# Patient Record
Sex: FEMALE | Race: WHITE | NOT HISPANIC OR LATINO
[De-identification: names, ages, dates, MRNs, and addresses within clinical notes are randomized per-mention and may not be internally consistent; named-entity substitution may affect disease eponyms.]

---

## 2017-08-01 ENCOUNTER — TRANSCRIPTION ENCOUNTER (OUTPATIENT)
Age: 42
End: 2017-08-01

## 2018-01-28 ENCOUNTER — EMERGENCY (EMERGENCY)
Facility: HOSPITAL | Age: 43
LOS: 0 days | Discharge: HOME | End: 2018-01-28

## 2018-01-28 DIAGNOSIS — Z87.891 PERSONAL HISTORY OF NICOTINE DEPENDENCE: ICD-10-CM

## 2018-01-28 DIAGNOSIS — N89.8 OTHER SPECIFIED NONINFLAMMATORY DISORDERS OF VAGINA: ICD-10-CM

## 2019-09-20 ENCOUNTER — EMERGENCY (EMERGENCY)
Facility: HOSPITAL | Age: 44
LOS: 0 days | Discharge: HOME | End: 2019-09-21
Attending: EMERGENCY MEDICINE | Admitting: EMERGENCY MEDICINE
Payer: SUBSIDIZED

## 2019-09-20 VITALS
RESPIRATION RATE: 18 BRPM | OXYGEN SATURATION: 98 % | DIASTOLIC BLOOD PRESSURE: 69 MMHG | HEART RATE: 87 BPM | SYSTOLIC BLOOD PRESSURE: 106 MMHG | TEMPERATURE: 99 F

## 2019-09-20 VITALS
DIASTOLIC BLOOD PRESSURE: 81 MMHG | OXYGEN SATURATION: 98 % | WEIGHT: 134.04 LBS | RESPIRATION RATE: 18 BRPM | HEART RATE: 88 BPM | TEMPERATURE: 97 F | SYSTOLIC BLOOD PRESSURE: 144 MMHG

## 2019-09-20 DIAGNOSIS — R10.9 UNSPECIFIED ABDOMINAL PAIN: ICD-10-CM

## 2019-09-20 DIAGNOSIS — O20.9 HEMORRHAGE IN EARLY PREGNANCY, UNSPECIFIED: ICD-10-CM

## 2019-09-20 PROCEDURE — 99053 MED SERV 10PM-8AM 24 HR FAC: CPT

## 2019-09-20 PROCEDURE — 99284 EMERGENCY DEPT VISIT MOD MDM: CPT

## 2019-09-20 RX ORDER — LEVOTHYROXINE SODIUM 125 MCG
1 TABLET ORAL
Qty: 0 | Refills: 0 | DISCHARGE

## 2019-09-20 RX ORDER — SODIUM CHLORIDE 9 MG/ML
1000 INJECTION INTRAMUSCULAR; INTRAVENOUS; SUBCUTANEOUS ONCE
Refills: 0 | Status: COMPLETED | OUTPATIENT
Start: 2019-09-20 | End: 2019-09-20

## 2019-09-20 NOTE — ED ADULT NURSE NOTE - CAS ELECT INFOMATION PROVIDED
DC instructions/pt instructed to follow up with obgyn as soon as possible, return to ed for new or worsening symptoms

## 2019-09-20 NOTE — ED ADULT NURSE NOTE - OBJECTIVE STATEMENT
Pt states she hasn't had her monthly in 2 months, took pregnancy test x 2 which stated positive. States began experiencing vaginal bleeding x yesterday and suprapubic pain x today

## 2019-09-20 NOTE — ED ADULT TRIAGE NOTE - CHIEF COMPLAINT QUOTE
Pt came c/o abdominal cramping and vaginal spotting since yesterday, pt stated her last menstrual period was on July 24 and pregnancy test was positive.

## 2019-09-21 LAB
ALBUMIN SERPL ELPH-MCNC: 4.1 G/DL — SIGNIFICANT CHANGE UP (ref 3.5–5.2)
ALP SERPL-CCNC: 49 U/L — SIGNIFICANT CHANGE UP (ref 30–115)
ALT FLD-CCNC: 28 U/L — SIGNIFICANT CHANGE UP (ref 0–41)
ANION GAP SERPL CALC-SCNC: 12 MMOL/L — SIGNIFICANT CHANGE UP (ref 7–14)
APPEARANCE UR: ABNORMAL
AST SERPL-CCNC: 39 U/L — SIGNIFICANT CHANGE UP (ref 0–41)
BACTERIA # UR AUTO: NEGATIVE — SIGNIFICANT CHANGE UP
BASOPHILS # BLD AUTO: 0.06 K/UL — SIGNIFICANT CHANGE UP (ref 0–0.2)
BASOPHILS NFR BLD AUTO: 0.4 % — SIGNIFICANT CHANGE UP (ref 0–1)
BILIRUB SERPL-MCNC: <0.2 MG/DL — SIGNIFICANT CHANGE UP (ref 0.2–1.2)
BILIRUB UR-MCNC: NEGATIVE — SIGNIFICANT CHANGE UP
BLD GP AB SCN SERPL QL: SIGNIFICANT CHANGE UP
BUN SERPL-MCNC: 19 MG/DL — SIGNIFICANT CHANGE UP (ref 10–20)
CALCIUM SERPL-MCNC: 9.4 MG/DL — SIGNIFICANT CHANGE UP (ref 8.5–10.1)
CHLORIDE SERPL-SCNC: 102 MMOL/L — SIGNIFICANT CHANGE UP (ref 98–110)
CO2 SERPL-SCNC: 23 MMOL/L — SIGNIFICANT CHANGE UP (ref 17–32)
COLOR SPEC: SIGNIFICANT CHANGE UP
CREAT SERPL-MCNC: 1.1 MG/DL — SIGNIFICANT CHANGE UP (ref 0.7–1.5)
DIFF PNL FLD: NEGATIVE — SIGNIFICANT CHANGE UP
EOSINOPHIL # BLD AUTO: 0.14 K/UL — SIGNIFICANT CHANGE UP (ref 0–0.7)
EOSINOPHIL NFR BLD AUTO: 0.9 % — SIGNIFICANT CHANGE UP (ref 0–8)
EPI CELLS # UR: 2 /HPF — SIGNIFICANT CHANGE UP (ref 0–5)
GLUCOSE SERPL-MCNC: 77 MG/DL — SIGNIFICANT CHANGE UP (ref 70–99)
GLUCOSE UR QL: NEGATIVE — SIGNIFICANT CHANGE UP
HCG SERPL-ACNC: HIGH MIU/ML
HCT VFR BLD CALC: 35.1 % — LOW (ref 37–47)
HGB BLD-MCNC: 11.7 G/DL — LOW (ref 12–16)
HYALINE CASTS # UR AUTO: 0 /LPF — SIGNIFICANT CHANGE UP (ref 0–7)
IMM GRANULOCYTES NFR BLD AUTO: 0.3 % — SIGNIFICANT CHANGE UP (ref 0.1–0.3)
KETONES UR-MCNC: NEGATIVE — SIGNIFICANT CHANGE UP
LACTATE SERPL-SCNC: 0.7 MMOL/L — SIGNIFICANT CHANGE UP (ref 0.5–2.2)
LEUKOCYTE ESTERASE UR-ACNC: NEGATIVE — SIGNIFICANT CHANGE UP
LIDOCAIN IGE QN: 50 U/L — SIGNIFICANT CHANGE UP (ref 7–60)
LYMPHOCYTES # BLD AUTO: 31.6 % — SIGNIFICANT CHANGE UP (ref 20.5–51.1)
LYMPHOCYTES # BLD AUTO: 4.72 K/UL — HIGH (ref 1.2–3.4)
MCHC RBC-ENTMCNC: 32 PG — HIGH (ref 27–31)
MCHC RBC-ENTMCNC: 33.3 G/DL — SIGNIFICANT CHANGE UP (ref 32–37)
MCV RBC AUTO: 95.9 FL — SIGNIFICANT CHANGE UP (ref 81–99)
MONOCYTES # BLD AUTO: 1 K/UL — HIGH (ref 0.1–0.6)
MONOCYTES NFR BLD AUTO: 6.7 % — SIGNIFICANT CHANGE UP (ref 1.7–9.3)
NEUTROPHILS # BLD AUTO: 8.96 K/UL — HIGH (ref 1.4–6.5)
NEUTROPHILS NFR BLD AUTO: 60.1 % — SIGNIFICANT CHANGE UP (ref 42.2–75.2)
NITRITE UR-MCNC: NEGATIVE — SIGNIFICANT CHANGE UP
NRBC # BLD: 0 /100 WBCS — SIGNIFICANT CHANGE UP (ref 0–0)
PH UR: 8 — SIGNIFICANT CHANGE UP (ref 5–8)
PLATELET # BLD AUTO: 222 K/UL — SIGNIFICANT CHANGE UP (ref 130–400)
POTASSIUM SERPL-MCNC: 5 MMOL/L — SIGNIFICANT CHANGE UP (ref 3.5–5)
POTASSIUM SERPL-SCNC: 5 MMOL/L — SIGNIFICANT CHANGE UP (ref 3.5–5)
PROT SERPL-MCNC: 6.5 G/DL — SIGNIFICANT CHANGE UP (ref 6–8)
PROT UR-MCNC: SIGNIFICANT CHANGE UP
RBC # BLD: 3.66 M/UL — LOW (ref 4.2–5.4)
RBC # FLD: 13.4 % — SIGNIFICANT CHANGE UP (ref 11.5–14.5)
RBC CASTS # UR COMP ASSIST: 1 /HPF — SIGNIFICANT CHANGE UP (ref 0–4)
SODIUM SERPL-SCNC: 137 MMOL/L — SIGNIFICANT CHANGE UP (ref 135–146)
SP GR SPEC: 1.02 — SIGNIFICANT CHANGE UP (ref 1.01–1.02)
UROBILINOGEN FLD QL: SIGNIFICANT CHANGE UP
WBC # BLD: 14.93 K/UL — HIGH (ref 4.8–10.8)
WBC # FLD AUTO: 14.93 K/UL — HIGH (ref 4.8–10.8)
WBC UR QL: 5 /HPF — SIGNIFICANT CHANGE UP (ref 0–5)

## 2019-09-21 PROCEDURE — 76856 US EXAM PELVIC COMPLETE: CPT | Mod: 26

## 2019-09-21 RX ADMIN — SODIUM CHLORIDE 1000 MILLILITER(S): 9 INJECTION INTRAMUSCULAR; INTRAVENOUS; SUBCUTANEOUS at 00:10

## 2019-09-21 NOTE — ED PROVIDER NOTE - PHYSICAL EXAMINATION
CONST: Well appearing in NAD  EYES: Sclera and conjunctiva clear.  CARD: Normal S1 S2; Normal rate and rhythm  RESP: Equal BS B/L, No wheezes, rhonchi or rales. No distress  GI: Soft, non-tender, non-distended, no CVA tenderness  : no adnexal tenderness, closed cervical os, no CMT, normal external genitalia, chaperoned by Dr. Ruano.  MS: Normal ROM in all extremities. No edema of lower extremities, no calf pain, radial pulses 2+ bilaterally  SKIN: Warm, dry, no acute rashes. Good turgor

## 2019-09-21 NOTE — ED PROVIDER NOTE - OBJECTIVE STATEMENT
44 y.o female presents to the ED for evaluation of abd pain. States that she has been experiencing lower abd cramping and vaginal bleeding x 2 days. Gradual onset, intermittent, worse w/ certain movements, mild severity, no radiation of pain. LMP 7/24 and has f/u with ob 10/4. Denies diarrhea, constipation, urinary sxs, fever, chills, back pain.  No further complaints at this time.

## 2019-09-21 NOTE — ED PROVIDER NOTE - PROGRESS NOTE DETAILS
Discussed results with pt.  All questions were answered and return precautions discussed.  Pt is asx and comfortable at this time.  Unremarkable re-exam.  No further concerns at this time from pt.  Will follow up with PMD and ob/gyn.  Pt understands and agrees with tx plan.

## 2019-09-21 NOTE — ED PROVIDER NOTE - NS ED ROS FT
Constitutional: See HPI.  Eyes: No visual changes, eye pain or discharge.   ENMT: No hearing changes, pain, discharge or infections.   Cardiac: No SOB or edema. No chest pain with exertion.  Respiratory: No cough or respiratory distress.   GI: + abdominal cramping. No nausea, vomiting, diarrhea.  : + vaginal bleeding. No dysuria, frequency or burning. No Discharge  MS: No myalgia, muscle weakness, joint pain or back pain.  Neuro: No headache or weakness.   Skin: No skin rash.  Except as documented in the HPI, all other systems are negative.

## 2019-09-21 NOTE — ED PROVIDER NOTE - CLINICAL SUMMARY MEDICAL DECISION MAKING FREE TEXT BOX
Patient remained stable in ED, Discussed with OBGYN on call, recommended to have patient follow up as outpatient. Discussed with patient in detail about the pelvic rest and the need for close outpatient follow up with OBGYN doctor in 2-3 days, she verbalized understanding and agreed.

## 2019-09-21 NOTE — ED PROVIDER NOTE - NSFOLLOWUPCLINICS_GEN_ALL_ED_FT
[facility]  OB/GYN  [address] [place] [address]  Phone: [phone]  Fax:   Follow Up Time: 1-3 Days

## 2019-09-21 NOTE — ED PROVIDER NOTE - ATTENDING CONTRIBUTION TO CARE
Patient is c/o intermittent episodes of lower abdominal cramping associated with vaginal spotting x 2 days, denies f/c/cp/sob. Patient states that knows is pregnant, so had made an appointment with her OBGYN doctor at [facility] for october 4th, denies trauma, denies urinary symptoms, no vaginal bleeding.   vitals noted  lungs: CTA, no crackles  abd: +BS, NT, ND, soft  Pelvic Exam-- Patient permission obtained for pelvic exam, patient did not want to have speculum exam, but agreed with bimanual exam. Pelvic exam is done by MILY Davis, chaperoned and supervised by me.   Pelvic exam: no rash, no lesions, no bleeding noted, no discharge, cervix is long and OS is closed, no CMT, no adnexal mass/tenderness.   CNS: awake, alert, o x 3, no focal neurologic deficits  patient is ambulatory in ED, comfortable  A/P: Pelvic pain/vaginal spotting  labs, US, reevaluation

## 2019-09-21 NOTE — ED PROVIDER NOTE - PATIENT PORTAL LINK FT
You can access the FollowMyHealth Patient Portal offered by [facility] by registering at the following website: [email or web address]. By joining [email or web address]’s FollowMyHealth portal, you will also be able to view your health information using other applications (apps) compatible with our system.

## 2019-09-23 PROBLEM — E03.9 HYPOTHYROIDISM, UNSPECIFIED: Chronic | Status: ACTIVE | Noted: 2019-09-20

## 2019-09-24 RX ORDER — NITROFURANTOIN MACROCRYSTAL 50 MG
1 CAPSULE ORAL
Qty: 14 | Refills: 0
Start: 2019-09-24 | End: 2019-09-30

## 2019-09-27 ENCOUNTER — NON-APPOINTMENT (OUTPATIENT)
Age: 44
End: 2019-09-27

## 2019-09-27 ENCOUNTER — OUTPATIENT (OUTPATIENT)
Dept: OUTPATIENT SERVICES | Facility: HOSPITAL | Age: 44
LOS: 1 days | Discharge: HOME | End: 2019-09-27

## 2019-09-27 ENCOUNTER — APPOINTMENT (OUTPATIENT)
Dept: OBGYN | Facility: CLINIC | Age: 44
End: 2019-09-27
Payer: MEDICAID

## 2019-09-27 VITALS
HEIGHT: 60 IN | DIASTOLIC BLOOD PRESSURE: 60 MMHG | SYSTOLIC BLOOD PRESSURE: 90 MMHG | WEIGHT: 139 LBS | BODY MASS INDEX: 27.29 KG/M2

## 2019-09-27 DIAGNOSIS — Z86.39 PERSONAL HISTORY OF OTHER ENDOCRINE, NUTRITIONAL AND METABOLIC DISEASE: ICD-10-CM

## 2019-09-27 PROCEDURE — 99204 OFFICE O/P NEW MOD 45 MIN: CPT | Mod: 25

## 2019-09-27 PROCEDURE — 76801 OB US < 14 WKS SINGLE FETUS: CPT | Mod: 26

## 2019-09-27 RX ORDER — FENUGREEK SEED/BL.THISTLE/ANIS 340 MG
18-0.8 & 29 CAPSULE ORAL
Qty: 1 | Refills: 3 | Status: ACTIVE | COMMUNITY
Start: 2019-09-27 | End: 1900-01-01

## 2019-09-30 DIAGNOSIS — Z34.90 ENCOUNTER FOR SUPERVISION OF NORMAL PREGNANCY, UNSPECIFIED, UNSPECIFIED TRIMESTER: ICD-10-CM

## 2019-09-30 DIAGNOSIS — E03.9 HYPOTHYROIDISM, UNSPECIFIED: ICD-10-CM

## 2019-10-02 ENCOUNTER — NON-APPOINTMENT (OUTPATIENT)
Age: 44
End: 2019-10-02

## 2019-10-02 LAB
ABO + RH PNL BLD: NORMAL
BACTERIA UR CULT: NORMAL
BASOPHILS # BLD AUTO: 0.05 K/UL
BASOPHILS NFR BLD AUTO: 0.5 %
BLD GP AB SCN SERPL QL: NORMAL
C TRACH RRNA SPEC QL NAA+PROBE: NOT DETECTED
EOSINOPHIL # BLD AUTO: 0.12 K/UL
EOSINOPHIL NFR BLD AUTO: 1.1 %
HBV SURFACE AG SER QL: NONREACTIVE
HCT VFR BLD CALC: 38.9 %
HGB A MFR BLD: 96.9 %
HGB A2 MFR BLD: 2.8 %
HGB BLD-MCNC: 12.5 G/DL
HGB F MFR BLD: 0.3 %
HGB FRACT BLD-IMP: NORMAL
HIV1+2 AB SPEC QL IA.RAPID: NONREACTIVE
IMM GRANULOCYTES NFR BLD AUTO: 0.4 %
LEAD BLD-MCNC: <1 UG/DL
LYMPHOCYTES # BLD AUTO: 2.88 K/UL
LYMPHOCYTES NFR BLD AUTO: 26.3 %
M TB IFN-G BLD-IMP: POSITIVE
MAN DIFF?: NORMAL
MCHC RBC-ENTMCNC: 31.6 PG
MCHC RBC-ENTMCNC: 32.1 G/DL
MCV RBC AUTO: 98.5 FL
MEV IGG FLD QL IA: >300 AU/ML
MEV IGG+IGM SER-IMP: POSITIVE
MONOCYTES # BLD AUTO: 0.61 K/UL
MONOCYTES NFR BLD AUTO: 5.6 %
N GONORRHOEA RRNA SPEC QL NAA+PROBE: NOT DETECTED
NEUTROPHILS # BLD AUTO: 7.24 K/UL
NEUTROPHILS NFR BLD AUTO: 66.1 %
PLATELET # BLD AUTO: 273 K/UL
QUANTIFERON TB PLUS MITOGEN MINUS NIL: 0.87 IU/ML
QUANTIFERON TB PLUS NIL: 0.03 IU/ML
QUANTIFERON TB PLUS TB1 MINUS NIL: 0.4 IU/ML
QUANTIFERON TB PLUS TB2 MINUS NIL: 0.19 IU/ML
RBC # BLD: 3.95 M/UL
RBC # FLD: 14 %
RUBV IGG FLD-ACNC: 13.2 INDEX
RUBV IGG SER-IMP: POSITIVE
SOURCE AMPLIFICATION: NORMAL
T PALLIDUM AB SER QL IA: NEGATIVE
T3 SERPL-MCNC: 104 NG/DL
T4 SERPL-MCNC: 8 UG/DL
TSH SERPL-ACNC: 3.57 UIU/ML
VZV AB TITR SER: POSITIVE
VZV IGG SER IF-ACNC: 870.8 INDEX
WBC # FLD AUTO: 10.94 K/UL

## 2019-10-05 ENCOUNTER — TRANSCRIPTION ENCOUNTER (OUTPATIENT)
Age: 44
End: 2019-10-05

## 2019-10-07 LAB
FMR1 GENE MUT ANL BLD/T: NORMAL
HPV HIGH+LOW RISK DNA PNL CVX: NOT DETECTED

## 2019-10-08 DIAGNOSIS — Z14.1 CYSTIC FIBROSIS CARRIER: ICD-10-CM

## 2019-10-09 PROBLEM — Z14.1 CYSTIC FIBROSIS CARRIER: Status: ACTIVE | Noted: 2019-10-09

## 2019-10-09 LAB
AR GENE MUT ANL BLD/T: NEGATIVE
CFTR MUT TESTED BLD/T: POSITIVE

## 2019-10-25 ENCOUNTER — ASOB RESULT (OUTPATIENT)
Age: 44
End: 2019-10-25

## 2019-10-25 ENCOUNTER — APPOINTMENT (OUTPATIENT)
Dept: OBGYN | Facility: CLINIC | Age: 44
End: 2019-10-25
Payer: COMMERCIAL

## 2019-10-25 ENCOUNTER — APPOINTMENT (OUTPATIENT)
Dept: ANTEPARTUM | Facility: CLINIC | Age: 44
End: 2019-10-25
Payer: COMMERCIAL

## 2019-10-25 ENCOUNTER — NON-APPOINTMENT (OUTPATIENT)
Age: 44
End: 2019-10-25

## 2019-10-25 ENCOUNTER — RESULT CHARGE (OUTPATIENT)
Age: 44
End: 2019-10-25

## 2019-10-25 ENCOUNTER — OUTPATIENT (OUTPATIENT)
Dept: OUTPATIENT SERVICES | Facility: HOSPITAL | Age: 44
LOS: 1 days | Discharge: HOME | End: 2019-10-25

## 2019-10-25 VITALS
HEIGHT: 60 IN | SYSTOLIC BLOOD PRESSURE: 102 MMHG | WEIGHT: 142.56 LBS | DIASTOLIC BLOOD PRESSURE: 62 MMHG | BODY MASS INDEX: 27.99 KG/M2

## 2019-10-25 LAB
BILIRUB UR QL STRIP: NORMAL
CLARITY UR: CLEAR
COLLECTION METHOD: NORMAL
GLUCOSE UR-MCNC: NORMAL
HCG UR QL: 0.2 EU/DL
HGB UR QL STRIP.AUTO: NORMAL
KETONES UR-MCNC: NORMAL
LEUKOCYTE ESTERASE UR QL STRIP: NORMAL
NITRITE UR QL STRIP: NORMAL
PH UR STRIP: 7
PROT UR STRIP-MCNC: NORMAL
SP GR UR STRIP: 1.01

## 2019-10-25 PROCEDURE — 0502F SUBSEQUENT PRENATAL CARE: CPT

## 2019-10-25 PROCEDURE — 99211 OFF/OP EST MAY X REQ PHY/QHP: CPT | Mod: 25

## 2019-10-25 PROCEDURE — 76813 OB US NUCHAL MEAS 1 GEST: CPT | Mod: 26

## 2019-10-27 NOTE — HISTORY OF PRESENT ILLNESS
[Hospital: ([facility]) ___] : at the hospital in  [Patient & Provider:  (Enter provider's Role) ____] : The patient, [unfilled] and [unfilled] ~ecp~  participated in the telehealth encounter

## 2019-10-28 ENCOUNTER — NON-APPOINTMENT (OUTPATIENT)
Age: 44
End: 2019-10-28

## 2019-10-28 DIAGNOSIS — O09.529 SUPERVISION OF ELDERLY MULTIGRAVIDA, UNSPECIFIED TRIMESTER: ICD-10-CM

## 2019-10-28 DIAGNOSIS — O35.9XX0 MATERNAL CARE FOR (SUSPECTED) FETAL ABNORMALITY AND DAMAGE, UNSPECIFIED, NOT APPLICABLE OR UNSPECIFIED: ICD-10-CM

## 2019-10-28 DIAGNOSIS — Z36.89 ENCOUNTER FOR OTHER SPECIFIED ANTENATAL SCREENING: ICD-10-CM

## 2019-10-28 DIAGNOSIS — Z3A.13 13 WEEKS GESTATION OF PREGNANCY: ICD-10-CM

## 2019-10-28 DIAGNOSIS — Z36.3 ENCOUNTER FOR ANTENATAL SCREENING FOR MALFORMATIONS: ICD-10-CM

## 2019-10-28 DIAGNOSIS — Z36.82 ENCOUNTER FOR ANTENATAL SCREENING FOR NUCHAL TRANSLUCENCY: ICD-10-CM

## 2019-10-29 DIAGNOSIS — Z34.90 ENCOUNTER FOR SUPERVISION OF NORMAL PREGNANCY, UNSPECIFIED, UNSPECIFIED TRIMESTER: ICD-10-CM

## 2019-11-05 ENCOUNTER — MEDICATION RENEWAL (OUTPATIENT)
Age: 44
End: 2019-11-05

## 2019-11-05 ENCOUNTER — NON-APPOINTMENT (OUTPATIENT)
Age: 44
End: 2019-11-05

## 2019-11-06 ENCOUNTER — RX RENEWAL (OUTPATIENT)
Age: 44
End: 2019-11-06

## 2019-11-22 ENCOUNTER — NON-APPOINTMENT (OUTPATIENT)
Age: 44
End: 2019-11-22

## 2019-11-22 ENCOUNTER — OUTPATIENT (OUTPATIENT)
Dept: OUTPATIENT SERVICES | Facility: HOSPITAL | Age: 44
LOS: 1 days | Discharge: HOME | End: 2019-11-22

## 2019-11-22 ENCOUNTER — RESULT CHARGE (OUTPATIENT)
Age: 44
End: 2019-11-22

## 2019-11-22 ENCOUNTER — APPOINTMENT (OUTPATIENT)
Dept: OBGYN | Facility: CLINIC | Age: 44
End: 2019-11-22
Payer: COMMERCIAL

## 2019-11-22 VITALS
DIASTOLIC BLOOD PRESSURE: 62 MMHG | BODY MASS INDEX: 54.65 KG/M2 | WEIGHT: 278.38 LBS | HEIGHT: 60 IN | SYSTOLIC BLOOD PRESSURE: 102 MMHG

## 2019-11-22 PROCEDURE — 99212 OFFICE O/P EST SF 10 MIN: CPT

## 2019-11-23 ENCOUNTER — LABORATORY RESULT (OUTPATIENT)
Age: 44
End: 2019-11-23

## 2019-11-23 ENCOUNTER — OUTPATIENT (OUTPATIENT)
Dept: OUTPATIENT SERVICES | Facility: HOSPITAL | Age: 44
LOS: 1 days | Discharge: HOME | End: 2019-11-23

## 2019-11-23 DIAGNOSIS — E03.9 HYPOTHYROIDISM, UNSPECIFIED: ICD-10-CM

## 2019-11-23 DIAGNOSIS — Z34.90 ENCOUNTER FOR SUPERVISION OF NORMAL PREGNANCY, UNSPECIFIED, UNSPECIFIED TRIMESTER: ICD-10-CM

## 2019-11-28 ENCOUNTER — NON-APPOINTMENT (OUTPATIENT)
Age: 44
End: 2019-11-28

## 2019-11-29 DIAGNOSIS — E03.9 HYPOTHYROIDISM, UNSPECIFIED: ICD-10-CM

## 2019-11-29 LAB — TSH SERPL-ACNC: 2.53 UIU/ML

## 2019-12-13 ENCOUNTER — ASOB RESULT (OUTPATIENT)
Age: 44
End: 2019-12-13

## 2019-12-13 ENCOUNTER — OUTPATIENT (OUTPATIENT)
Dept: OUTPATIENT SERVICES | Facility: HOSPITAL | Age: 44
LOS: 1 days | Discharge: HOME | End: 2019-12-13

## 2019-12-13 ENCOUNTER — APPOINTMENT (OUTPATIENT)
Dept: ANTEPARTUM | Facility: CLINIC | Age: 44
End: 2019-12-13
Payer: COMMERCIAL

## 2019-12-13 PROCEDURE — 76811 OB US DETAILED SNGL FETUS: CPT | Mod: 26

## 2019-12-19 DIAGNOSIS — Z3A.20 20 WEEKS GESTATION OF PREGNANCY: ICD-10-CM

## 2019-12-19 DIAGNOSIS — Z36.84 ENCOUNTER FOR ANTENATAL SCREENING FOR FETAL LUNG MATURITY: ICD-10-CM

## 2019-12-19 DIAGNOSIS — O35.9XX0 MATERNAL CARE FOR (SUSPECTED) FETAL ABNORMALITY AND DAMAGE, UNSPECIFIED, NOT APPLICABLE OR UNSPECIFIED: ICD-10-CM

## 2019-12-19 DIAGNOSIS — Z36.3 ENCOUNTER FOR ANTENATAL SCREENING FOR MALFORMATIONS: ICD-10-CM

## 2019-12-19 DIAGNOSIS — O09.529 SUPERVISION OF ELDERLY MULTIGRAVIDA, UNSPECIFIED TRIMESTER: ICD-10-CM

## 2019-12-20 ENCOUNTER — RESULT CHARGE (OUTPATIENT)
Age: 44
End: 2019-12-20

## 2019-12-20 ENCOUNTER — NON-APPOINTMENT (OUTPATIENT)
Age: 44
End: 2019-12-20

## 2019-12-20 ENCOUNTER — APPOINTMENT (OUTPATIENT)
Dept: OBGYN | Facility: CLINIC | Age: 44
End: 2019-12-20
Payer: COMMERCIAL

## 2019-12-20 ENCOUNTER — OUTPATIENT (OUTPATIENT)
Dept: OUTPATIENT SERVICES | Facility: HOSPITAL | Age: 44
LOS: 1 days | Discharge: HOME | End: 2019-12-20

## 2019-12-20 VITALS — WEIGHT: 156 LBS | BODY MASS INDEX: 30.47 KG/M2 | DIASTOLIC BLOOD PRESSURE: 60 MMHG | SYSTOLIC BLOOD PRESSURE: 100 MMHG

## 2019-12-20 LAB
BILIRUB UR QL STRIP: NEGATIVE
CLARITY UR: CLEAR
COLLECTION METHOD: NORMAL
GLUCOSE UR-MCNC: NEGATIVE
HCG UR QL: NEGATIVE EU/DL
HGB UR QL STRIP.AUTO: NEGATIVE
KETONES UR-MCNC: NEGATIVE
LEUKOCYTE ESTERASE UR QL STRIP: NEGATIVE
NITRITE UR QL STRIP: NEGATIVE
PH UR STRIP: 6
PROT UR STRIP-MCNC: NEGATIVE
SP GR UR STRIP: 1.02

## 2019-12-20 PROCEDURE — 99212 OFFICE O/P EST SF 10 MIN: CPT

## 2019-12-24 DIAGNOSIS — Z34.90 ENCOUNTER FOR SUPERVISION OF NORMAL PREGNANCY, UNSPECIFIED, UNSPECIFIED TRIMESTER: ICD-10-CM

## 2019-12-24 DIAGNOSIS — E03.9 HYPOTHYROIDISM, UNSPECIFIED: ICD-10-CM

## 2019-12-26 ENCOUNTER — OUTPATIENT (OUTPATIENT)
Dept: OUTPATIENT SERVICES | Facility: HOSPITAL | Age: 44
LOS: 1 days | Discharge: HOME | End: 2019-12-26

## 2019-12-26 ENCOUNTER — APPOINTMENT (OUTPATIENT)
Dept: ANTEPARTUM | Facility: CLINIC | Age: 44
End: 2019-12-26
Payer: COMMERCIAL

## 2019-12-26 ENCOUNTER — ASOB RESULT (OUTPATIENT)
Age: 44
End: 2019-12-26

## 2019-12-26 PROCEDURE — 76816 OB US FOLLOW-UP PER FETUS: CPT | Mod: 26

## 2019-12-30 DIAGNOSIS — Z3A.22 22 WEEKS GESTATION OF PREGNANCY: ICD-10-CM

## 2019-12-30 DIAGNOSIS — O09.522 SUPERVISION OF ELDERLY MULTIGRAVIDA, SECOND TRIMESTER: ICD-10-CM

## 2019-12-30 DIAGNOSIS — Z04.89 ENCOUNTER FOR EXAMINATION AND OBSERVATION FOR OTHER SPECIFIED REASONS: ICD-10-CM

## 2020-01-17 ENCOUNTER — NON-APPOINTMENT (OUTPATIENT)
Age: 45
End: 2020-01-17

## 2020-01-17 ENCOUNTER — OUTPATIENT (OUTPATIENT)
Dept: OUTPATIENT SERVICES | Facility: HOSPITAL | Age: 45
LOS: 1 days | Discharge: HOME | End: 2020-01-17

## 2020-01-17 ENCOUNTER — RESULT CHARGE (OUTPATIENT)
Age: 45
End: 2020-01-17

## 2020-01-17 ENCOUNTER — APPOINTMENT (OUTPATIENT)
Dept: OBGYN | Facility: CLINIC | Age: 45
End: 2020-01-17
Payer: COMMERCIAL

## 2020-01-17 VITALS
DIASTOLIC BLOOD PRESSURE: 58 MMHG | SYSTOLIC BLOOD PRESSURE: 110 MMHG | BODY MASS INDEX: 31.41 KG/M2 | WEIGHT: 160 LBS | HEIGHT: 60 IN

## 2020-01-17 LAB
BILIRUB UR QL STRIP: NORMAL
CLARITY UR: CLEAR
COLLECTION METHOD: NORMAL
GLUCOSE UR-MCNC: NORMAL
HCG UR QL: 0.2 EU/DL
HGB UR QL STRIP.AUTO: NORMAL
KETONES UR-MCNC: NORMAL
LEUKOCYTE ESTERASE UR QL STRIP: NORMAL
NITRITE UR QL STRIP: NORMAL
PH UR STRIP: 7.5
PROT UR STRIP-MCNC: NORMAL
SP GR UR STRIP: 1.02

## 2020-01-17 PROCEDURE — 99212 OFFICE O/P EST SF 10 MIN: CPT

## 2020-01-21 DIAGNOSIS — E03.9 HYPOTHYROIDISM, UNSPECIFIED: ICD-10-CM

## 2020-01-21 DIAGNOSIS — Z34.90 ENCOUNTER FOR SUPERVISION OF NORMAL PREGNANCY, UNSPECIFIED, UNSPECIFIED TRIMESTER: ICD-10-CM

## 2020-01-27 ENCOUNTER — OTHER (OUTPATIENT)
Age: 45
End: 2020-01-27

## 2020-01-27 LAB
BASOPHILS # BLD AUTO: 0.05 K/UL
BASOPHILS NFR BLD AUTO: 0.4 %
EOSINOPHIL # BLD AUTO: 0.07 K/UL
EOSINOPHIL NFR BLD AUTO: 0.5 %
GLUCOSE 1H P 50 G GLC PO SERPL-MCNC: 151 MG/DL
HCT VFR BLD CALC: 33 %
HGB BLD-MCNC: 10.8 G/DL
IMM GRANULOCYTES NFR BLD AUTO: 0.4 %
LYMPHOCYTES # BLD AUTO: 2.41 K/UL
LYMPHOCYTES NFR BLD AUTO: 17.3 %
MAN DIFF?: NORMAL
MCHC RBC-ENTMCNC: 32.5 PG
MCHC RBC-ENTMCNC: 32.7 G/DL
MCV RBC AUTO: 99.4 FL
MONOCYTES # BLD AUTO: 0.7 K/UL
MONOCYTES NFR BLD AUTO: 5 %
NEUTROPHILS # BLD AUTO: 10.69 K/UL
NEUTROPHILS NFR BLD AUTO: 76.4 %
PLATELET # BLD AUTO: 241 K/UL
RBC # BLD: 3.32 M/UL
RBC # FLD: 13.6 %
T3FREE SERPL-MCNC: 2.59 PG/ML
T4 FREE SERPL-MCNC: 1.1 NG/DL
TSH SERPL-ACNC: 1.23 UIU/ML
TSH SERPL-ACNC: 1.3 UIU/ML
WBC # FLD AUTO: 13.97 K/UL

## 2020-02-14 ENCOUNTER — APPOINTMENT (OUTPATIENT)
Dept: OBGYN | Facility: CLINIC | Age: 45
End: 2020-02-14
Payer: COMMERCIAL

## 2020-02-14 ENCOUNTER — OUTPATIENT (OUTPATIENT)
Dept: OUTPATIENT SERVICES | Facility: HOSPITAL | Age: 45
LOS: 1 days | Discharge: HOME | End: 2020-02-14

## 2020-02-14 ENCOUNTER — RESULT CHARGE (OUTPATIENT)
Age: 45
End: 2020-02-14

## 2020-02-14 VITALS
WEIGHT: 164 LBS | BODY MASS INDEX: 32.2 KG/M2 | SYSTOLIC BLOOD PRESSURE: 104 MMHG | DIASTOLIC BLOOD PRESSURE: 62 MMHG | HEIGHT: 60 IN

## 2020-02-14 LAB
BILIRUB UR QL STRIP: NORMAL
CLARITY UR: CLEAR
COLLECTION METHOD: NORMAL
GLUCOSE UR-MCNC: NORMAL
HCG UR QL: 0.2 EU/DL
HGB UR QL STRIP.AUTO: NORMAL
KETONES UR-MCNC: NORMAL
LEUKOCYTE ESTERASE UR QL STRIP: NORMAL
NITRITE UR QL STRIP: NORMAL
PH UR STRIP: 6
PROT UR STRIP-MCNC: NORMAL
SP GR UR STRIP: 1.02

## 2020-02-14 PROCEDURE — 99212 OFFICE O/P EST SF 10 MIN: CPT

## 2020-02-17 ENCOUNTER — NON-APPOINTMENT (OUTPATIENT)
Age: 45
End: 2020-02-17

## 2020-02-17 RX ORDER — LEVOTHYROXINE SODIUM 0.09 MG/1
88 TABLET ORAL DAILY
Qty: 30 | Refills: 1 | Status: DISCONTINUED | COMMUNITY
Start: 2019-09-27 | End: 2020-02-17

## 2020-02-17 RX ORDER — LEVOTHYROXINE SODIUM 0.09 MG/1
88 TABLET ORAL DAILY
Qty: 30 | Refills: 2 | Status: DISCONTINUED | COMMUNITY
Start: 2019-11-06 | End: 2020-02-17

## 2020-02-18 DIAGNOSIS — O09.529 SUPERVISION OF ELDERLY MULTIGRAVIDA, UNSPECIFIED TRIMESTER: ICD-10-CM

## 2020-02-18 DIAGNOSIS — E03.9 HYPOTHYROIDISM, UNSPECIFIED: ICD-10-CM

## 2020-02-18 DIAGNOSIS — Z34.90 ENCOUNTER FOR SUPERVISION OF NORMAL PREGNANCY, UNSPECIFIED, UNSPECIFIED TRIMESTER: ICD-10-CM

## 2020-02-26 ENCOUNTER — APPOINTMENT (OUTPATIENT)
Dept: ANTEPARTUM | Facility: CLINIC | Age: 45
End: 2020-02-26
Payer: COMMERCIAL

## 2020-02-26 ENCOUNTER — ASOB RESULT (OUTPATIENT)
Age: 45
End: 2020-02-26

## 2020-02-26 ENCOUNTER — OUTPATIENT (OUTPATIENT)
Dept: OUTPATIENT SERVICES | Facility: HOSPITAL | Age: 45
LOS: 1 days | Discharge: HOME | End: 2020-02-26

## 2020-02-26 ENCOUNTER — APPOINTMENT (OUTPATIENT)
Dept: OBGYN | Facility: CLINIC | Age: 45
End: 2020-02-26
Payer: COMMERCIAL

## 2020-02-26 ENCOUNTER — RESULT CHARGE (OUTPATIENT)
Age: 45
End: 2020-02-26

## 2020-02-26 ENCOUNTER — NON-APPOINTMENT (OUTPATIENT)
Age: 45
End: 2020-02-26

## 2020-02-26 VITALS
HEIGHT: 60 IN | WEIGHT: 163.31 LBS | TEMPERATURE: 98.4 F | BODY MASS INDEX: 32.06 KG/M2 | DIASTOLIC BLOOD PRESSURE: 62 MMHG | OXYGEN SATURATION: 96 % | HEART RATE: 82 BPM | SYSTOLIC BLOOD PRESSURE: 106 MMHG

## 2020-02-26 LAB
BILIRUB UR QL STRIP: NORMAL
CLARITY UR: CLEAR
COLLECTION METHOD: NORMAL
GLUCOSE UR-MCNC: NORMAL
HCG UR QL: 0.2 EU/DL
HGB UR QL STRIP.AUTO: NORMAL
KETONES UR-MCNC: NORMAL
LEUKOCYTE ESTERASE UR QL STRIP: NORMAL
NITRITE UR QL STRIP: NORMAL
PH UR STRIP: 6
PROT UR STRIP-MCNC: NORMAL
SP GR UR STRIP: 1.01

## 2020-02-26 PROCEDURE — 99212 OFFICE O/P EST SF 10 MIN: CPT

## 2020-02-26 PROCEDURE — 76816 OB US FOLLOW-UP PER FETUS: CPT | Mod: 26

## 2020-02-26 PROCEDURE — 76820 UMBILICAL ARTERY ECHO: CPT | Mod: 26

## 2020-02-26 PROCEDURE — 76818 FETAL BIOPHYS PROFILE W/NST: CPT | Mod: 26

## 2020-02-26 PROCEDURE — 99213 OFFICE O/P EST LOW 20 MIN: CPT

## 2020-02-27 DIAGNOSIS — Z34.90 ENCOUNTER FOR SUPERVISION OF NORMAL PREGNANCY, UNSPECIFIED, UNSPECIFIED TRIMESTER: ICD-10-CM

## 2020-03-01 ENCOUNTER — NON-APPOINTMENT (OUTPATIENT)
Age: 45
End: 2020-03-01

## 2020-03-01 LAB
GLUCOSE 1H P 100 G GLC PO SERPL-MCNC: 175 MG/DL
GLUCOSE 2H P CHAL SERPL-MCNC: 175 MG/DL
GLUCOSE 3H P CHAL SERPL-MCNC: 130 MG/DL
GLUCOSE BS SERPL-MCNC: 62 MG/DL

## 2020-03-06 ENCOUNTER — APPOINTMENT (OUTPATIENT)
Dept: ANTEPARTUM | Facility: CLINIC | Age: 45
End: 2020-03-06
Payer: COMMERCIAL

## 2020-03-06 ENCOUNTER — ASOB RESULT (OUTPATIENT)
Age: 45
End: 2020-03-06

## 2020-03-06 ENCOUNTER — OUTPATIENT (OUTPATIENT)
Dept: OUTPATIENT SERVICES | Facility: HOSPITAL | Age: 45
LOS: 1 days | Discharge: HOME | End: 2020-03-06

## 2020-03-06 PROCEDURE — 76818 FETAL BIOPHYS PROFILE W/NST: CPT | Mod: 26

## 2020-03-06 PROCEDURE — ZZZZZ: CPT

## 2020-03-10 ENCOUNTER — NON-APPOINTMENT (OUTPATIENT)
Age: 45
End: 2020-03-10

## 2020-03-10 DIAGNOSIS — O36.5990 MATERNAL CARE FOR OTHER KNOWN OR SUSPECTED POOR FETAL GROWTH, UNSPECIFIED TRIMESTER, NOT APPLICABLE OR UNSPECIFIED: ICD-10-CM

## 2020-03-10 DIAGNOSIS — O09.529 SUPERVISION OF ELDERLY MULTIGRAVIDA, UNSPECIFIED TRIMESTER: ICD-10-CM

## 2020-03-10 DIAGNOSIS — Z3A.32 32 WEEKS GESTATION OF PREGNANCY: ICD-10-CM

## 2020-03-11 ENCOUNTER — APPOINTMENT (OUTPATIENT)
Dept: ANTEPARTUM | Facility: CLINIC | Age: 45
End: 2020-03-11
Payer: COMMERCIAL

## 2020-03-11 ENCOUNTER — ASOB RESULT (OUTPATIENT)
Age: 45
End: 2020-03-11

## 2020-03-11 ENCOUNTER — OUTPATIENT (OUTPATIENT)
Dept: OUTPATIENT SERVICES | Facility: HOSPITAL | Age: 45
LOS: 1 days | Discharge: HOME | End: 2020-03-11

## 2020-03-11 PROCEDURE — 76820 UMBILICAL ARTERY ECHO: CPT | Mod: 26

## 2020-03-11 PROCEDURE — 76818 FETAL BIOPHYS PROFILE W/NST: CPT | Mod: 26

## 2020-03-11 PROCEDURE — 76821 MIDDLE CEREBRAL ARTERY ECHO: CPT | Mod: 26

## 2020-03-13 ENCOUNTER — OUTPATIENT (OUTPATIENT)
Dept: OUTPATIENT SERVICES | Facility: HOSPITAL | Age: 45
LOS: 1 days | Discharge: HOME | End: 2020-03-13

## 2020-03-13 ENCOUNTER — NON-APPOINTMENT (OUTPATIENT)
Age: 45
End: 2020-03-13

## 2020-03-13 ENCOUNTER — APPOINTMENT (OUTPATIENT)
Dept: OBGYN | Facility: CLINIC | Age: 45
End: 2020-03-13
Payer: COMMERCIAL

## 2020-03-13 ENCOUNTER — RESULT CHARGE (OUTPATIENT)
Age: 45
End: 2020-03-13

## 2020-03-13 VITALS
SYSTOLIC BLOOD PRESSURE: 116 MMHG | WEIGHT: 168 LBS | DIASTOLIC BLOOD PRESSURE: 62 MMHG | BODY MASS INDEX: 32.98 KG/M2 | HEIGHT: 60 IN

## 2020-03-13 LAB
BILIRUB UR QL STRIP: NEGATIVE
CLARITY UR: CLEAR
COLLECTION METHOD: NORMAL
GLUCOSE UR-MCNC: NEGATIVE
HCG UR QL: NEGATIVE EU/DL
HGB UR QL STRIP.AUTO: NEGATIVE
KETONES UR-MCNC: NORMAL
LEUKOCYTE ESTERASE UR QL STRIP: NEGATIVE
NITRITE UR QL STRIP: NEGATIVE
PH UR STRIP: 6
PROT UR STRIP-MCNC: NEGATIVE
SP GR UR STRIP: 1.01

## 2020-03-13 PROCEDURE — 0502F SUBSEQUENT PRENATAL CARE: CPT

## 2020-03-16 LAB
T3FREE SERPL-MCNC: 2.55 PG/ML
T4 FREE SERPL-MCNC: 1 NG/DL
TSH SERPL-ACNC: 3.01 UIU/ML

## 2020-03-17 DIAGNOSIS — O36.5990 MATERNAL CARE FOR OTHER KNOWN OR SUSPECTED POOR FETAL GROWTH, UNSPECIFIED TRIMESTER, NOT APPLICABLE OR UNSPECIFIED: ICD-10-CM

## 2020-03-17 DIAGNOSIS — Z34.90 ENCOUNTER FOR SUPERVISION OF NORMAL PREGNANCY, UNSPECIFIED, UNSPECIFIED TRIMESTER: ICD-10-CM

## 2020-03-17 DIAGNOSIS — O09.529 SUPERVISION OF ELDERLY MULTIGRAVIDA, UNSPECIFIED TRIMESTER: ICD-10-CM

## 2020-03-17 DIAGNOSIS — E03.9 HYPOTHYROIDISM, UNSPECIFIED: ICD-10-CM

## 2020-03-18 ENCOUNTER — APPOINTMENT (OUTPATIENT)
Dept: ANTEPARTUM | Facility: CLINIC | Age: 45
End: 2020-03-18
Payer: COMMERCIAL

## 2020-03-18 ENCOUNTER — OUTPATIENT (OUTPATIENT)
Dept: OUTPATIENT SERVICES | Facility: HOSPITAL | Age: 45
LOS: 1 days | Discharge: HOME | End: 2020-03-18

## 2020-03-18 ENCOUNTER — ASOB RESULT (OUTPATIENT)
Age: 45
End: 2020-03-18

## 2020-03-18 PROCEDURE — 76818 FETAL BIOPHYS PROFILE W/NST: CPT | Mod: 26

## 2020-03-18 PROCEDURE — 76820 UMBILICAL ARTERY ECHO: CPT | Mod: 26

## 2020-03-18 PROCEDURE — 76821 MIDDLE CEREBRAL ARTERY ECHO: CPT | Mod: 26

## 2020-03-19 ENCOUNTER — NON-APPOINTMENT (OUTPATIENT)
Age: 45
End: 2020-03-19

## 2020-03-20 DIAGNOSIS — O36.5990 MATERNAL CARE FOR OTHER KNOWN OR SUSPECTED POOR FETAL GROWTH, UNSPECIFIED TRIMESTER, NOT APPLICABLE OR UNSPECIFIED: ICD-10-CM

## 2020-03-20 DIAGNOSIS — O09.523 SUPERVISION OF ELDERLY MULTIGRAVIDA, THIRD TRIMESTER: ICD-10-CM

## 2020-03-20 DIAGNOSIS — Z3A.33 33 WEEKS GESTATION OF PREGNANCY: ICD-10-CM

## 2020-03-25 ENCOUNTER — APPOINTMENT (OUTPATIENT)
Dept: ANTEPARTUM | Facility: CLINIC | Age: 45
End: 2020-03-25
Payer: COMMERCIAL

## 2020-03-25 ENCOUNTER — OUTPATIENT (OUTPATIENT)
Dept: OUTPATIENT SERVICES | Facility: HOSPITAL | Age: 45
LOS: 1 days | Discharge: HOME | End: 2020-03-25

## 2020-03-25 ENCOUNTER — ASOB RESULT (OUTPATIENT)
Age: 45
End: 2020-03-25

## 2020-03-25 PROCEDURE — 76816 OB US FOLLOW-UP PER FETUS: CPT | Mod: 26

## 2020-03-25 PROCEDURE — 76818 FETAL BIOPHYS PROFILE W/NST: CPT | Mod: 26

## 2020-03-25 PROCEDURE — 76821 MIDDLE CEREBRAL ARTERY ECHO: CPT | Mod: 26

## 2020-03-25 PROCEDURE — 76820 UMBILICAL ARTERY ECHO: CPT | Mod: 26

## 2020-03-26 DIAGNOSIS — O09.513 SUPERVISION OF ELDERLY PRIMIGRAVIDA, THIRD TRIMESTER: ICD-10-CM

## 2020-03-26 DIAGNOSIS — Z3A.34 34 WEEKS GESTATION OF PREGNANCY: ICD-10-CM

## 2020-03-26 DIAGNOSIS — O36.5990 MATERNAL CARE FOR OTHER KNOWN OR SUSPECTED POOR FETAL GROWTH, UNSPECIFIED TRIMESTER, NOT APPLICABLE OR UNSPECIFIED: ICD-10-CM

## 2020-03-27 ENCOUNTER — NON-APPOINTMENT (OUTPATIENT)
Age: 45
End: 2020-03-27

## 2020-03-27 ENCOUNTER — RESULT CHARGE (OUTPATIENT)
Age: 45
End: 2020-03-27

## 2020-03-27 ENCOUNTER — OUTPATIENT (OUTPATIENT)
Dept: OUTPATIENT SERVICES | Facility: HOSPITAL | Age: 45
LOS: 1 days | Discharge: HOME | End: 2020-03-27

## 2020-03-27 ENCOUNTER — INPATIENT (INPATIENT)
Facility: HOSPITAL | Age: 45
LOS: 1 days | Discharge: HOME | End: 2020-03-29
Attending: OBSTETRICS & GYNECOLOGY | Admitting: OBSTETRICS & GYNECOLOGY
Payer: COMMERCIAL

## 2020-03-27 ENCOUNTER — APPOINTMENT (OUTPATIENT)
Dept: OBGYN | Facility: CLINIC | Age: 45
End: 2020-03-27
Payer: COMMERCIAL

## 2020-03-27 VITALS
TEMPERATURE: 98 F | DIASTOLIC BLOOD PRESSURE: 64 MMHG | RESPIRATION RATE: 16 BRPM | HEART RATE: 100 BPM | SYSTOLIC BLOOD PRESSURE: 92 MMHG

## 2020-03-27 VITALS — DIASTOLIC BLOOD PRESSURE: 64 MMHG | SYSTOLIC BLOOD PRESSURE: 108 MMHG | WEIGHT: 168 LBS | BODY MASS INDEX: 32.81 KG/M2

## 2020-03-27 DIAGNOSIS — Z98.890 OTHER SPECIFIED POSTPROCEDURAL STATES: Chronic | ICD-10-CM

## 2020-03-27 LAB
APPEARANCE UR: ABNORMAL
BACTERIA # UR AUTO: NEGATIVE — SIGNIFICANT CHANGE UP
BASOPHILS # BLD AUTO: 0.05 K/UL — SIGNIFICANT CHANGE UP (ref 0–0.2)
BASOPHILS NFR BLD AUTO: 0.3 % — SIGNIFICANT CHANGE UP (ref 0–1)
BILIRUB UR QL STRIP: NORMAL
BILIRUB UR-MCNC: NEGATIVE — SIGNIFICANT CHANGE UP
BLD GP AB SCN SERPL QL: SIGNIFICANT CHANGE UP
CLARITY UR: CLEAR
COLLECTION METHOD: NORMAL
COLOR SPEC: ABNORMAL
DIFF PNL FLD: ABNORMAL
EOSINOPHIL # BLD AUTO: 0.16 K/UL — SIGNIFICANT CHANGE UP (ref 0–0.7)
EOSINOPHIL NFR BLD AUTO: 1 % — SIGNIFICANT CHANGE UP (ref 0–8)
EPI CELLS # UR: 12 /HPF — HIGH (ref 0–5)
GLUCOSE UR QL: NEGATIVE — SIGNIFICANT CHANGE UP
GLUCOSE UR-MCNC: NORMAL
HCG UR QL: NORMAL EU/DL
HCT VFR BLD CALC: 33.7 % — LOW (ref 37–47)
HGB BLD-MCNC: 11.7 G/DL — LOW (ref 12–16)
HGB UR QL STRIP.AUTO: NORMAL
HYALINE CASTS # UR AUTO: 23 /LPF — HIGH (ref 0–7)
IMM GRANULOCYTES NFR BLD AUTO: 0.6 % — HIGH (ref 0.1–0.3)
KETONES UR-MCNC: ABNORMAL
KETONES UR-MCNC: NORMAL
LEUKOCYTE ESTERASE UR QL STRIP: NORMAL
LEUKOCYTE ESTERASE UR-ACNC: ABNORMAL
LYMPHOCYTES # BLD AUTO: 17.9 % — LOW (ref 20.5–51.1)
LYMPHOCYTES # BLD AUTO: 2.77 K/UL — SIGNIFICANT CHANGE UP (ref 1.2–3.4)
MCHC RBC-ENTMCNC: 34.1 PG — HIGH (ref 27–31)
MCHC RBC-ENTMCNC: 34.7 G/DL — SIGNIFICANT CHANGE UP (ref 32–37)
MCV RBC AUTO: 98.3 FL — SIGNIFICANT CHANGE UP (ref 81–99)
MONOCYTES # BLD AUTO: 0.94 K/UL — HIGH (ref 0.1–0.6)
MONOCYTES NFR BLD AUTO: 6.1 % — SIGNIFICANT CHANGE UP (ref 1.7–9.3)
NEUTROPHILS # BLD AUTO: 11.5 K/UL — HIGH (ref 1.4–6.5)
NEUTROPHILS NFR BLD AUTO: 74.1 % — SIGNIFICANT CHANGE UP (ref 42.2–75.2)
NITRITE UR QL STRIP: NORMAL
NITRITE UR-MCNC: NEGATIVE — SIGNIFICANT CHANGE UP
NRBC # BLD: 0 /100 WBCS — SIGNIFICANT CHANGE UP (ref 0–0)
PH UR STRIP: 6
PH UR: 7.5 — SIGNIFICANT CHANGE UP (ref 5–8)
PLATELET # BLD AUTO: 244 K/UL — SIGNIFICANT CHANGE UP (ref 130–400)
PRENATAL SYPHILIS TEST: SIGNIFICANT CHANGE UP
PROT UR STRIP-MCNC: NORMAL
PROT UR-MCNC: SIGNIFICANT CHANGE UP
RBC # BLD: 3.43 M/UL — LOW (ref 4.2–5.4)
RBC # FLD: 13.2 % — SIGNIFICANT CHANGE UP (ref 11.5–14.5)
RBC CASTS # UR COMP ASSIST: >720 /HPF — HIGH (ref 0–4)
SP GR SPEC: 1.01 — LOW (ref 1.01–1.02)
SP GR UR STRIP: 1.01
UROBILINOGEN FLD QL: SIGNIFICANT CHANGE UP
WBC # BLD: 15.51 K/UL — HIGH (ref 4.8–10.8)
WBC # FLD AUTO: 15.51 K/UL — HIGH (ref 4.8–10.8)
WBC UR QL: 164 /HPF — HIGH (ref 0–5)

## 2020-03-27 PROCEDURE — 59514 CESAREAN DELIVERY ONLY: CPT | Mod: U7

## 2020-03-27 PROCEDURE — 59425 ANTEPARTUM CARE ONLY: CPT

## 2020-03-27 PROCEDURE — 0502F SUBSEQUENT PRENATAL CARE: CPT

## 2020-03-27 RX ORDER — ACETAMINOPHEN 500 MG
1000 TABLET ORAL ONCE
Refills: 0 | Status: DISCONTINUED | OUTPATIENT
Start: 2020-03-27 | End: 2020-03-29

## 2020-03-27 RX ORDER — MORPHINE SULFATE 50 MG/1
0.2 CAPSULE, EXTENDED RELEASE ORAL ONCE
Refills: 0 | Status: DISCONTINUED | OUTPATIENT
Start: 2020-03-27 | End: 2020-03-29

## 2020-03-27 RX ORDER — LEVOTHYROXINE SODIUM 125 MCG
88 TABLET ORAL DAILY
Refills: 0 | Status: DISCONTINUED | OUTPATIENT
Start: 2020-03-27 | End: 2020-03-28

## 2020-03-27 RX ORDER — OXYTOCIN 10 UNIT/ML
333.33 VIAL (ML) INJECTION
Qty: 20 | Refills: 0 | Status: DISCONTINUED | OUTPATIENT
Start: 2020-03-27 | End: 2020-03-28

## 2020-03-27 RX ORDER — DEXAMETHASONE 0.5 MG/5ML
4 ELIXIR ORAL EVERY 6 HOURS
Refills: 0 | Status: DISCONTINUED | OUTPATIENT
Start: 2020-03-27 | End: 2020-03-29

## 2020-03-27 RX ORDER — SODIUM CHLORIDE 9 MG/ML
1000 INJECTION, SOLUTION INTRAVENOUS
Refills: 0 | Status: DISCONTINUED | OUTPATIENT
Start: 2020-03-27 | End: 2020-03-28

## 2020-03-27 RX ORDER — SODIUM CHLORIDE 9 MG/ML
1000 INJECTION, SOLUTION INTRAVENOUS
Refills: 0 | Status: DISCONTINUED | OUTPATIENT
Start: 2020-03-27 | End: 2020-03-29

## 2020-03-27 RX ORDER — AZITHROMYCIN 500 MG/1
500 TABLET, FILM COATED ORAL ONCE
Refills: 0 | Status: COMPLETED | OUTPATIENT
Start: 2020-03-27 | End: 2020-03-27

## 2020-03-27 RX ORDER — ONDANSETRON 8 MG/1
4 TABLET, FILM COATED ORAL EVERY 6 HOURS
Refills: 0 | Status: DISCONTINUED | OUTPATIENT
Start: 2020-03-27 | End: 2020-03-29

## 2020-03-27 RX ORDER — NALOXONE HYDROCHLORIDE 4 MG/.1ML
0.1 SPRAY NASAL
Refills: 0 | Status: DISCONTINUED | OUTPATIENT
Start: 2020-03-27 | End: 2020-03-29

## 2020-03-27 RX ORDER — FAMOTIDINE 10 MG/ML
20 INJECTION INTRAVENOUS ONCE
Refills: 0 | Status: DISCONTINUED | OUTPATIENT
Start: 2020-03-27 | End: 2020-03-28

## 2020-03-27 RX ORDER — METOCLOPRAMIDE HCL 10 MG
10 TABLET ORAL ONCE
Refills: 0 | Status: DISCONTINUED | OUTPATIENT
Start: 2020-03-27 | End: 2020-03-28

## 2020-03-27 RX ORDER — CITRIC ACID/SODIUM CITRATE 300-500 MG
30 SOLUTION, ORAL ORAL ONCE
Refills: 0 | Status: DISCONTINUED | OUTPATIENT
Start: 2020-03-27 | End: 2020-03-27

## 2020-03-27 RX ORDER — SODIUM CHLORIDE 9 MG/ML
1000 INJECTION, SOLUTION INTRAVENOUS ONCE
Refills: 0 | Status: DISCONTINUED | OUTPATIENT
Start: 2020-03-27 | End: 2020-03-28

## 2020-03-27 RX ORDER — CEFAZOLIN SODIUM 1 G
2000 VIAL (EA) INJECTION ONCE
Refills: 0 | Status: COMPLETED | OUTPATIENT
Start: 2020-03-27 | End: 2020-03-27

## 2020-03-27 RX ORDER — HYDROMORPHONE HYDROCHLORIDE 2 MG/ML
1 INJECTION INTRAMUSCULAR; INTRAVENOUS; SUBCUTANEOUS
Refills: 0 | Status: DISCONTINUED | OUTPATIENT
Start: 2020-03-27 | End: 2020-03-29

## 2020-03-27 RX ADMIN — AZITHROMYCIN 255 MILLIGRAM(S): 500 TABLET, FILM COATED ORAL at 22:35

## 2020-03-27 RX ADMIN — Medication 100 MILLIGRAM(S): at 22:10

## 2020-03-27 NOTE — OB PROVIDER H&P - HISTORY OF PRESENT ILLNESS
43yo  at 35w1d, dated by LMP with 1st trimester sonogram, with LOF, clear at 0430. She reports intermittent contractions. Denies VB and reports good FM. Breech pregnancy complicated by CF carrier status, Quant. positive, needs CXR postpartum.

## 2020-03-27 NOTE — OB PROVIDER H&P - NSHPPHYSICALEXAM_GEN_ALL_CORE
T(C): 36.7 (03-27-20 @ 22:18), Max: 36.7 (03-27-20 @ 20:07)  HR: 85 (03-27-20 @ 22:18) (85 - 100)  BP: 114/66 (03-27-20 @ 22:18) (92/64 - 114/66)  RR: 16 (03-27-20 @ 22:18) (16 - 16)  SpO2: --  BMI (kg/m2): 28.8 (03-27-20 @ 22:18)    Gen: A+OX3. NAD  Abd: Soft, Nontender. Gravid.  FHR: 130bpm/mod mando/accels+  TOCO: 3-5min   SVE: 2/80/-2, complete breech

## 2020-03-27 NOTE — OB PROVIDER H&P - NSHPLABSRESULTS_GEN_ALL_CORE
3/25: EFW 2200g (14%), breech, normal UA dopplers.   3/18: breech, anterior placenta, 33w6d, MVP 4.2cm  3/11: BPP 10/10  2/26: 1291g, 3vn15ru, 3%,     GTT: 62/175/175/130      CF carrier  Quant. Positive CXR needed

## 2020-03-27 NOTE — OB RN PREOPERATIVE CHECKLIST - LOOSE TEETH
Jt Dupree is here for her initial visit to [facility]  this school year  Consent verified  She is currently in 12th grade at [address]: [employer]  Brigette Lilly is passing all of her classes so far this school year  She did not graduate last school year, which caused tension with her parents  She chose to move out before they kicked her out and is now living with her boyfriend and his family  Due to lack of health insurance, Brigette Lilly has stopped her birth control and metformin 2 months ago  She was also diagnosed with high cholesterol, but hasn't made any lifestyle changes  Sushantstacy Coronel is interested in [employer] program on the [employer] and was given log to begin  [facility]  Insurance: Pending - able to apply for insurance on her boyfriend's work insurance plan  PCP: Referred - Provider list given  Dental: Referred - DV consent returned  Vision: Referred - passed vision but last visit >1 year  Mental Health: PHQ-9=15; no self harm risk     Student in to meet with Provider due to elevated PHQ-9  no

## 2020-03-27 NOTE — OB PROVIDER H&P - ASSESSMENT
43yo  at 35w1d, CF carrier status, hx of IUGR, now resolved, breech presentation, in labor.  -Admit to L+D  -Monitor EFM and TOCO   -IVF and labs  -Pain control PRN; on call to OR  -NPO  -Quant. positive, needs CXR postpartum.  -CF carrier status     Dr. Gupta aware, Dr. Zhao aware

## 2020-03-27 NOTE — PRE-ANESTHESIA EVALUATION ADULT - NSANTHOSAYNRD_GEN_A_CORE
No. CRIS screening performed.  STOP BANG Legend: 0-2 = LOW Risk; 3-4 = INTERMEDIATE Risk; 5-8 = HIGH Risk

## 2020-03-27 NOTE — OB PROVIDER H&P - NS_BEFORE39WEEKS_OBGYN_ALL_OB
Impression: Presbyopia: H52.4. Plan: Discussed diagnosis with patient. New glasses Rx given today. Yes

## 2020-03-27 NOTE — OB RN PATIENT PROFILE - PRENATAL LABORATORY TESTS, INFANT PROFILE
Cystic Fibrosis

## 2020-03-28 LAB
AMPHET UR-MCNC: NEGATIVE — SIGNIFICANT CHANGE UP
BARBITURATES UR SCN-MCNC: NEGATIVE — SIGNIFICANT CHANGE UP
BASOPHILS # BLD AUTO: 0.05 K/UL — SIGNIFICANT CHANGE UP (ref 0–0.2)
BASOPHILS NFR BLD AUTO: 0.3 % — SIGNIFICANT CHANGE UP (ref 0–1)
BENZODIAZ UR-MCNC: NEGATIVE — SIGNIFICANT CHANGE UP
BUPRENORPHINE SCREEN, URINE RESULT: NEGATIVE — SIGNIFICANT CHANGE UP
COCAINE METAB.OTHER UR-MCNC: NEGATIVE — SIGNIFICANT CHANGE UP
EOSINOPHIL # BLD AUTO: 0.18 K/UL — SIGNIFICANT CHANGE UP (ref 0–0.7)
EOSINOPHIL NFR BLD AUTO: 1.1 % — SIGNIFICANT CHANGE UP (ref 0–8)
FENTANYL UR QL: NEGATIVE — SIGNIFICANT CHANGE UP
HCT VFR BLD CALC: 34.4 % — LOW (ref 37–47)
HGB BLD-MCNC: 10.9 G/DL — LOW (ref 12–16)
HIV 1+2 AB+HIV1 P24 AG SERPL QL IA: SIGNIFICANT CHANGE UP
IMM GRANULOCYTES NFR BLD AUTO: 0.5 % — HIGH (ref 0.1–0.3)
L&D DRUG SCREEN, URINE: SIGNIFICANT CHANGE UP
LYMPHOCYTES # BLD AUTO: 14.1 % — LOW (ref 20.5–51.1)
LYMPHOCYTES # BLD AUTO: 2.39 K/UL — SIGNIFICANT CHANGE UP (ref 1.2–3.4)
MCHC RBC-ENTMCNC: 31.7 G/DL — LOW (ref 32–37)
MCHC RBC-ENTMCNC: 32.3 PG — HIGH (ref 27–31)
MCV RBC AUTO: 102.1 FL — HIGH (ref 81–99)
METHADONE UR-MCNC: NEGATIVE — SIGNIFICANT CHANGE UP
MONOCYTES # BLD AUTO: 0.78 K/UL — HIGH (ref 0.1–0.6)
MONOCYTES NFR BLD AUTO: 4.6 % — SIGNIFICANT CHANGE UP (ref 1.7–9.3)
NEUTROPHILS # BLD AUTO: 13.49 K/UL — HIGH (ref 1.4–6.5)
NEUTROPHILS NFR BLD AUTO: 79.4 % — HIGH (ref 42.2–75.2)
NRBC # BLD: 0 /100 WBCS — SIGNIFICANT CHANGE UP (ref 0–0)
OPIATES UR-MCNC: NEGATIVE — SIGNIFICANT CHANGE UP
OXYCODONE UR-MCNC: NEGATIVE — SIGNIFICANT CHANGE UP
PCP UR-MCNC: NEGATIVE — SIGNIFICANT CHANGE UP
PLATELET # BLD AUTO: 221 K/UL — SIGNIFICANT CHANGE UP (ref 130–400)
PROPOXYPHENE QUALITATIVE URINE RESULT: NEGATIVE — SIGNIFICANT CHANGE UP
RBC # BLD: 3.37 M/UL — LOW (ref 4.2–5.4)
RBC # FLD: 13.3 % — SIGNIFICANT CHANGE UP (ref 11.5–14.5)
WBC # BLD: 16.98 K/UL — HIGH (ref 4.8–10.8)
WBC # FLD AUTO: 16.98 K/UL — HIGH (ref 4.8–10.8)

## 2020-03-28 PROCEDURE — 71045 X-RAY EXAM CHEST 1 VIEW: CPT | Mod: 26

## 2020-03-28 PROCEDURE — 99231 SBSQ HOSP IP/OBS SF/LOW 25: CPT

## 2020-03-28 RX ORDER — ACETAMINOPHEN 500 MG
975 TABLET ORAL
Refills: 0 | Status: DISCONTINUED | OUTPATIENT
Start: 2020-03-28 | End: 2020-03-29

## 2020-03-28 RX ORDER — LEVOTHYROXINE SODIUM 125 MCG
75 TABLET ORAL DAILY
Refills: 0 | Status: DISCONTINUED | OUTPATIENT
Start: 2020-03-28 | End: 2020-03-29

## 2020-03-28 RX ORDER — OXYCODONE HYDROCHLORIDE 5 MG/1
5 TABLET ORAL ONCE
Refills: 0 | Status: DISCONTINUED | OUTPATIENT
Start: 2020-03-28 | End: 2020-03-29

## 2020-03-28 RX ORDER — KETOROLAC TROMETHAMINE 30 MG/ML
30 SYRINGE (ML) INJECTION EVERY 6 HOURS
Refills: 0 | Status: DISCONTINUED | OUTPATIENT
Start: 2020-03-28 | End: 2020-03-28

## 2020-03-28 RX ORDER — GLYCERIN ADULT
1 SUPPOSITORY, RECTAL RECTAL AT BEDTIME
Refills: 0 | Status: DISCONTINUED | OUTPATIENT
Start: 2020-03-28 | End: 2020-03-29

## 2020-03-28 RX ORDER — IBUPROFEN 200 MG
600 TABLET ORAL EVERY 6 HOURS
Refills: 0 | Status: COMPLETED | OUTPATIENT
Start: 2020-03-28 | End: 2021-02-24

## 2020-03-28 RX ORDER — SIMETHICONE 80 MG/1
80 TABLET, CHEWABLE ORAL EVERY 4 HOURS
Refills: 0 | Status: DISCONTINUED | OUTPATIENT
Start: 2020-03-28 | End: 2020-03-29

## 2020-03-28 RX ORDER — IBUPROFEN 200 MG
600 TABLET ORAL EVERY 6 HOURS
Refills: 0 | Status: DISCONTINUED | OUTPATIENT
Start: 2020-03-28 | End: 2020-03-29

## 2020-03-28 RX ORDER — DIPHENHYDRAMINE HCL 50 MG
25 CAPSULE ORAL EVERY 6 HOURS
Refills: 0 | Status: DISCONTINUED | OUTPATIENT
Start: 2020-03-28 | End: 2020-03-29

## 2020-03-28 RX ORDER — OXYCODONE HYDROCHLORIDE 5 MG/1
5 TABLET ORAL
Refills: 0 | Status: DISCONTINUED | OUTPATIENT
Start: 2020-03-28 | End: 2020-03-29

## 2020-03-28 RX ORDER — MAGNESIUM HYDROXIDE 400 MG/1
30 TABLET, CHEWABLE ORAL
Refills: 0 | Status: DISCONTINUED | OUTPATIENT
Start: 2020-03-28 | End: 2020-03-29

## 2020-03-28 RX ORDER — ENOXAPARIN SODIUM 100 MG/ML
40 INJECTION SUBCUTANEOUS EVERY 24 HOURS
Refills: 0 | Status: DISCONTINUED | OUTPATIENT
Start: 2020-03-28 | End: 2020-03-29

## 2020-03-28 RX ORDER — LANOLIN
1 OINTMENT (GRAM) TOPICAL EVERY 6 HOURS
Refills: 0 | Status: DISCONTINUED | OUTPATIENT
Start: 2020-03-28 | End: 2020-03-29

## 2020-03-28 RX ADMIN — SIMETHICONE 80 MILLIGRAM(S): 80 TABLET, CHEWABLE ORAL at 22:00

## 2020-03-28 RX ADMIN — Medication 975 MILLIGRAM(S): at 14:30

## 2020-03-28 RX ADMIN — SIMETHICONE 80 MILLIGRAM(S): 80 TABLET, CHEWABLE ORAL at 06:16

## 2020-03-28 RX ADMIN — Medication 600 MILLIGRAM(S): at 23:55

## 2020-03-28 RX ADMIN — Medication 975 MILLIGRAM(S): at 22:00

## 2020-03-28 RX ADMIN — Medication 30 MILLIGRAM(S): at 06:46

## 2020-03-28 RX ADMIN — Medication 975 MILLIGRAM(S): at 08:25

## 2020-03-28 RX ADMIN — Medication 975 MILLIGRAM(S): at 14:08

## 2020-03-28 RX ADMIN — SIMETHICONE 80 MILLIGRAM(S): 80 TABLET, CHEWABLE ORAL at 17:46

## 2020-03-28 RX ADMIN — ENOXAPARIN SODIUM 40 MILLIGRAM(S): 100 INJECTION SUBCUTANEOUS at 12:30

## 2020-03-28 RX ADMIN — Medication 30 MILLIGRAM(S): at 17:47

## 2020-03-28 RX ADMIN — Medication 75 MICROGRAM(S): at 06:16

## 2020-03-28 RX ADMIN — Medication 30 MILLIGRAM(S): at 18:45

## 2020-03-28 RX ADMIN — Medication 30 MILLIGRAM(S): at 13:00

## 2020-03-28 RX ADMIN — Medication 975 MILLIGRAM(S): at 22:30

## 2020-03-28 RX ADMIN — Medication 975 MILLIGRAM(S): at 08:30

## 2020-03-28 RX ADMIN — Medication 30 MILLIGRAM(S): at 12:30

## 2020-03-28 RX ADMIN — SIMETHICONE 80 MILLIGRAM(S): 80 TABLET, CHEWABLE ORAL at 12:29

## 2020-03-28 RX ADMIN — Medication 30 MILLIGRAM(S): at 06:16

## 2020-03-28 RX ADMIN — SODIUM CHLORIDE 125 MILLILITER(S): 9 INJECTION, SOLUTION INTRAVENOUS at 18:46

## 2020-03-28 NOTE — BRIEF OPERATIVE NOTE - NSICDXBRIEFPOSTOP_GEN_ALL_CORE_FT
POST-OP DIAGNOSIS:  35 weeks gestation of pregnancy 28-Mar-2020 00:32:00  Mary Caldwell   premature rupture of membranes 28-Mar-2020 00:31:48  Mary Caldwell  Breech presentation 28-Mar-2020 00:31:23  Mary Caldwell

## 2020-03-28 NOTE — PROGRESS NOTE ADULT - ASSESSMENT
A/P: 45yo now  POD#1 S/P PLTCS for breech presentation, h/o quantiferon positive with negative CXR, recovering well   - encourage ambulation  - encourage PO hydration  - encourage incentive spirometry use   - monitor vitals, bleeding   - simethicone/colace   - lovenox  - diet: regular   - continue IVF hydration until successful TOV   - hermosillo catheter removed, TOV 1320  - pain mgmt prn   - f/u AM CBC   - continue synthroid 75mcg for hypothyroidism     Dr. Wharton and OB/GYN service attending to be made aware A/P: 45yo now  POD#1 S/P PLTCS for breech presentation, h/o quantiferon positive with negative CXR, recovering well   - encourage ambulation  - encourage PO hydration  - encourage incentive spirometry use   - monitor vitals, bleeding   - simethicone/colace   - lovenox  - diet: regular   - continue IVF hydration until successful TOV   - hermosillo catheter removed, TOV 1320  - pain mgmt prn   - f/u AM CBC & pending HIV   - continue synthroid 75mcg for hypothyroidism     Dr. Wharton and OB/GYN service attending to be made aware

## 2020-03-28 NOTE — BRIEF OPERATIVE NOTE - OPERATION/FINDINGS
live female infant delivered in breech presentation, APGARS 9/9, weighing 2260gms  normal tubes and ovaries b/l  uterus w/ 2cm subserosal fibroid on left side of hysterotomy

## 2020-03-28 NOTE — PROGRESS NOTE ADULT - SUBJECTIVE AND OBJECTIVE BOX
JH SALDIVAR  44y  Female    PGY1 Note:    POD#1  Pt is recovering well, no acute complaints. Pt denies headache, chest pain, SOB, fever, chills, nausea, vomiting, extremity pain or swelling.     Ambulating: No, SCDs on   Voiding: No, hermosillo catheter in place  Flatus: No   Bowel movements: No    Breast or bottle feeding: Both   Diet: Regular    MEDICATIONS  (STANDING):  acetaminophen   Tablet .. 975 milliGRAM(s) Oral <User Schedule>  acetaminophen  IVPB .. 1000 milliGRAM(s) IV Intermittent once  enoxaparin Injectable 40 milliGRAM(s) SubCutaneous every 24 hours  ibuprofen  Tablet. 600 milliGRAM(s) Oral every 6 hours  ketorolac   Injectable 30 milliGRAM(s) IV Push every 6 hours  lactated ringers. 1000 milliLiter(s) (125 mL/Hr) IV Continuous <Continuous>  levothyroxine 75 MICROGram(s) Oral daily  morphine PF Spinal 0.2 milliGRAM(s) IntraThecal. once  simethicone 80 milliGRAM(s) Chew every 4 hours    MEDICATIONS  (PRN):  dexAMETHasone  Injectable 4 milliGRAM(s) IV Push every 6 hours PRN Nausea  diphenhydrAMINE 25 milliGRAM(s) Oral every 6 hours PRN Itching  glycerin Suppository - Adult 1 Suppository(s) Rectal at bedtime PRN Constipation  HYDROmorphone  Injectable 1 milliGRAM(s) IV Push every 10 minutes PRN Severe Pain (7 - 10)  lanolin Ointment 1 Application(s) Topical every 6 hours PRN Sore Nipples  magnesium hydroxide Suspension 30 milliLiter(s) Oral two times a day PRN Constipation  naloxone Injectable 0.1 milliGRAM(s) IV Push every 3 minutes PRN For ANY of the following changes in patient status:  A. Breaths Per Minute LESS THAN 10, B. Oxygen saturation LESS THAN 90%, C. Sedation score of 6 for Stop After: 4 Times  ondansetron Injectable 4 milliGRAM(s) IV Push every 6 hours PRN Nausea  oxyCODONE    IR 5 milliGRAM(s) Oral every 3 hours PRN Moderate to Severe Pain (4-10)  oxyCODONE    IR 5 milliGRAM(s) Oral once PRN Moderate to Severe Pain (4-10)    Physical Exam  Vital Signs Last 24 Hrs  T(C): 36.2 (28 Mar 2020 07:45), Max: 36.7 (27 Mar 2020 20:07)  T(F): 97.2 (28 Mar 2020 07:45), Max: 98 (27 Mar 2020 20:07)  HR: 80 (28 Mar 2020 07:45) (68 - 100)  BP: 94/55 (28 Mar 2020 07:45) (81/45 - 128/63)  RR: 18 (28 Mar 2020 07:45) (16 - 18)  SpO2: 90% (28 Mar 2020 04:18) (87% - 95%)    Gen: AAOx3, NAD  Fundus: firm, below umbilicus   Wound: surgical dressing removed, steris in place, incision c/d/i   Abd: Soft, nontender, nondistended, BS+  Lochia: minimal rubra   Ext: No calf tenderness, no swelling    Labs:                        11.7   15.51 )-----------( 244      ( 27 Mar 2020 21:10 )             33.7

## 2020-03-28 NOTE — BRIEF OPERATIVE NOTE - NSICDXBRIEFPREOP_GEN_ALL_CORE_FT
PRE-OP DIAGNOSIS:  35 weeks gestation of pregnancy 28-Mar-2020 00:31:14  Mary Caldwell   premature rupture of membranes 28-Mar-2020 00:30:59  Mary Caldwell  Breech presentation 28-Mar-2020 00:30:47  Mary Caldwell

## 2020-03-29 ENCOUNTER — TRANSCRIPTION ENCOUNTER (OUTPATIENT)
Age: 45
End: 2020-03-29

## 2020-03-29 VITALS
SYSTOLIC BLOOD PRESSURE: 100 MMHG | HEART RATE: 77 BPM | RESPIRATION RATE: 18 BRPM | TEMPERATURE: 96 F | DIASTOLIC BLOOD PRESSURE: 63 MMHG

## 2020-03-29 LAB
GROUP B BETA STREP DNA (PCR): DETECTED
GROUP B BETA STREP INTERPRETATION: SIGNIFICANT CHANGE UP
SOURCE GROUP B STREP: SIGNIFICANT CHANGE UP

## 2020-03-29 RX ORDER — SENNA PLUS 8.6 MG/1
2 TABLET ORAL AT BEDTIME
Refills: 0 | Status: DISCONTINUED | OUTPATIENT
Start: 2020-03-29 | End: 2020-03-29

## 2020-03-29 RX ORDER — ACETAMINOPHEN 500 MG
3 TABLET ORAL
Qty: 0 | Refills: 0 | DISCHARGE
Start: 2020-03-29

## 2020-03-29 RX ORDER — IBUPROFEN 200 MG
1 TABLET ORAL
Qty: 0 | Refills: 0 | DISCHARGE
Start: 2020-03-29

## 2020-03-29 RX ORDER — SIMETHICONE 80 MG/1
1 TABLET, CHEWABLE ORAL
Qty: 0 | Refills: 0 | DISCHARGE
Start: 2020-03-29

## 2020-03-29 RX ADMIN — SIMETHICONE 80 MILLIGRAM(S): 80 TABLET, CHEWABLE ORAL at 13:03

## 2020-03-29 RX ADMIN — Medication 600 MILLIGRAM(S): at 14:49

## 2020-03-29 RX ADMIN — Medication 75 MICROGRAM(S): at 06:32

## 2020-03-29 RX ADMIN — SIMETHICONE 80 MILLIGRAM(S): 80 TABLET, CHEWABLE ORAL at 06:32

## 2020-03-29 RX ADMIN — Medication 975 MILLIGRAM(S): at 15:54

## 2020-03-29 RX ADMIN — Medication 600 MILLIGRAM(S): at 00:25

## 2020-03-29 RX ADMIN — Medication 975 MILLIGRAM(S): at 03:51

## 2020-03-29 RX ADMIN — Medication 600 MILLIGRAM(S): at 06:32

## 2020-03-29 RX ADMIN — Medication 975 MILLIGRAM(S): at 04:28

## 2020-03-29 RX ADMIN — Medication 600 MILLIGRAM(S): at 13:03

## 2020-03-29 RX ADMIN — Medication 600 MILLIGRAM(S): at 07:02

## 2020-03-29 RX ADMIN — SIMETHICONE 80 MILLIGRAM(S): 80 TABLET, CHEWABLE ORAL at 09:48

## 2020-03-29 RX ADMIN — Medication 975 MILLIGRAM(S): at 08:31

## 2020-03-29 RX ADMIN — Medication 975 MILLIGRAM(S): at 14:48

## 2020-03-29 NOTE — DISCHARGE NOTE OB - PATIENT PORTAL LINK FT
You can access the FollowMyHealth Patient Portal offered by [facility] by registering at the following website: [email or web address]. By joining [employer]’s FollowMyHealth portal, you will also be able to view your health information using other applications (apps) compatible with our system.

## 2020-03-29 NOTE — DISCHARGE NOTE OB - PLAN OF CARE
healthy recovery Monitor vitals/labs.  To follow up with provider in 1 week for incision check, 6 weeks for postpartum visit.

## 2020-03-29 NOTE — PROGRESS NOTE ADULT - ASSESSMENT
A/P: 45 yo P1 s/p primary LTCS for breech, POD#2, recovering well   -ambulation encouraged  -PO hydration encouraged  -regular diet  -lovenox for DVT prophylaxis  -Incentive Spirometry encouraged  -pain management prn  -f/u AM CBC   -d/c home today, postpartum precautions  -instructed to follow up in 1 week for incision check, and 6 weeks for postpartum check     Dr. Gupta and Dr. Jose to be made aware.

## 2020-03-29 NOTE — DISCHARGE NOTE OB - ADDITIONAL INSTRUCTIONS
Nothing in the vagina for 6 weeks (no sex, no tampons, no douching). Avoid tub baths, you may shower.  If you have a fever of 100.4F or greater, severe vaginal bleeding, or severe abdominal pain, call your Ob/Gyn or come to the emergency department immediately.  Please follow up with your provider in 1 week for incision check, 6 weeks for postpartum visit.

## 2020-03-29 NOTE — DISCHARGE NOTE OB - CARE PROVIDER_API CALL
Jeanne Cooper)  OBSGYN  Physicians  [address] [place] [address]  Phone: [phone]  Fax: [phone]  Follow Up Time:

## 2020-03-29 NOTE — PROGRESS NOTE ADULT - SUBJECTIVE AND OBJECTIVE BOX
JH SALDIVAR  44y  Female    PGY1 Note:  Patient seen and examined bedside. No overnight events. Denies heavy vaginal bleeding. Pain well controlled. Ambulating without difficulty. Tolerating diet, voiding, passing flatus, no BM.     Physical Exam  Vital Signs Last 24 Hrs  T(C): 35.6 (29 Mar 2020 08:29), Max: 36.9 (28 Mar 2020 22:57)  T(F): 96 (29 Mar 2020 08:29), Max: 98.4 (28 Mar 2020 22:57)  HR: 77 (29 Mar 2020 08:29) (75 - 87)  BP: 100/63 (29 Mar 2020 08:29) (87/52 - 127/86)  RR: 18 (29 Mar 2020 08:29) (18 - 18)    Gen: NAD, sitting comfortably  CV: RRR. No murmurs gallops or rubs.  Pulm: CTAB. No wheezes or rales.  Ext: No calf tenderness, no swelling.   Abd: Nondistended, soft, nontender, BS+, fundus firm, and below umbilicus.   Lochia: Minimal, rubra  Wound: Pfannenstiel skin incision clean, dry intact. Steris in place. No surrounding edema or erythema.    PAST MEDICAL & SURGICAL HISTORY:  Hypothyroidism  H/O abdominoplasty      Diet: regular    Labs:                        10.9   16.98 )-----------( 221      ( 28 Mar 2020 17:59 )             34.4                         11.7   15.51 )-----------( 244      ( 27 Mar 2020 21:10 )             33.7      A pos, antibody neg  RPR NR  UDS neg    < from: Xray Chest 1 View- PORTABLE-Urgent (20 @ 01:08) >    PROCEDURE DATE:  2020        INTERPRETATION:  Clinical History / Reason for exam: 44-year-old post , positive TB test    Comparison : Chest radiograph None.    Technique/Positioning: AP portable.    Findings:    Support devices: None.    Cardiac/mediastinum/hilum: Unremarkable.    Lung parenchyma/Pleura: Within normal limits.    Skeleton/soft tissues: Unremarkable.    Impression:      No radiographic evidence of acute cardiopulmonary disease. No evidence of acute chronic tuberculosis infection.        MEDICATIONS  (STANDING):  acetaminophen   Tablet .. 975 milliGRAM(s) Oral <User Schedule>  acetaminophen  IVPB .. 1000 milliGRAM(s) IV Intermittent once  enoxaparin Injectable 40 milliGRAM(s) SubCutaneous every 24 hours  ibuprofen  Tablet. 600 milliGRAM(s) Oral every 6 hours  levothyroxine 75 MICROGram(s) Oral daily  senna 2 Tablet(s) Oral at bedtime  simethicone 80 milliGRAM(s) Chew every 4 hours    MEDICATIONS  (PRN):  dexAMETHasone  Injectable 4 milliGRAM(s) IV Push every 6 hours PRN Nausea  diphenhydrAMINE 25 milliGRAM(s) Oral every 6 hours PRN Itching  glycerin Suppository - Adult 1 Suppository(s) Rectal at bedtime PRN Constipation  HYDROmorphone  Injectable 1 milliGRAM(s) IV Push every 10 minutes PRN Severe Pain (7 - 10)  lanolin Ointment 1 Application(s) Topical every 6 hours PRN Sore Nipples  magnesium hydroxide Suspension 30 milliLiter(s) Oral two times a day PRN Constipation  naloxone Injectable 0.1 milliGRAM(s) IV Push every 3 minutes PRN For ANY of the following changes in patient status:  A. Breaths Per Minute LESS THAN 10, B. Oxygen saturation LESS THAN 90%, C. Sedation score of 6 for Stop After: 4 Times  ondansetron Injectable 4 milliGRAM(s) IV Push every 6 hours PRN Nausea  oxyCODONE    IR 5 milliGRAM(s) Oral every 3 hours PRN Moderate to Severe Pain (4-10)  oxyCODONE    IR 5 milliGRAM(s) Oral once PRN Moderate to Severe Pain (4-10)

## 2020-03-29 NOTE — DISCHARGE NOTE OB - CARE PLAN
Principal Discharge DX:	 delivery delivered  Goal:	healthy recovery  Assessment and plan of treatment:	Monitor vitals/labs.  To follow up with provider in 1 week for incision check, 6 weeks for postpartum visit.

## 2020-03-29 NOTE — DISCHARGE NOTE OB - HOSPITAL COURSE
-20 @ 08:56    HPI:  45yo  at 35w1d, dated by LMP with 1st trimester sonogram, with LOF, clear at 0430. She reports intermittent contractions. Denies VB and reports good FM. Breech pregnancy complicated by CF carrier status, Quant. positive, needs CXR postpartum. (27 Mar 2020 21:44)      PAST MEDICAL & SURGICAL HISTORY:  Hypothyroidism  H/O abdominoplasty      POST PARTUM COURSE: Uncomplicated postpartum course.         LABS:                        10.9   16.98 )-----------( 221      ( 28 Mar 2020 17:59 )             34.4                         11.7   15.51 )-----------( 244      ( 27 Mar 2020 21:10 )             33.7               Allergies    Allergy Status Unknown

## 2020-03-29 NOTE — DISCHARGE NOTE OB - MEDICATION SUMMARY - MEDICATIONS TO TAKE
I will START or STAY ON the medications listed below when I get home from the hospital:    ibuprofen 600 mg oral tablet  -- 1 tab(s) by mouth every 6 hours, As Needed  -- Indication: For pain    Tylenol 325 mg oral tablet  -- 3 tab(s) by mouth every 6 hours, As Needed  -- Indication: For pain    simethicone 80 mg oral tablet, chewable  -- 1 tab(s) by mouth every 4 hours, As Needed  -- Indication: For gas    Synthroid 75 mcg (0.075 mg) oral tablet  -- 1 tab(s) by mouth once a day  -- Indication: For Hypothyroid    nitrofurantoin macrocrystals-monohydrate 100 mg oral capsule  -- 1 cap(s) by mouth 2 times a day   -- Finish all this medication unless otherwise directed by prescriber.  May discolor urine or feces.  Take with food or milk.    -- Indication: For UTI

## 2020-03-30 DIAGNOSIS — O09.529 SUPERVISION OF ELDERLY MULTIGRAVIDA, UNSPECIFIED TRIMESTER: ICD-10-CM

## 2020-03-30 DIAGNOSIS — E03.9 HYPOTHYROIDISM, UNSPECIFIED: ICD-10-CM

## 2020-03-30 DIAGNOSIS — Z02.9 ENCOUNTER FOR ADMINISTRATIVE EXAMINATIONS, UNSPECIFIED: ICD-10-CM

## 2020-03-30 DIAGNOSIS — O36.5990 MATERNAL CARE FOR OTHER KNOWN OR SUSPECTED POOR FETAL GROWTH, UNSPECIFIED TRIMESTER, NOT APPLICABLE OR UNSPECIFIED: ICD-10-CM

## 2020-03-30 DIAGNOSIS — Z34.90 ENCOUNTER FOR SUPERVISION OF NORMAL PREGNANCY, UNSPECIFIED, UNSPECIFIED TRIMESTER: ICD-10-CM

## 2020-03-30 LAB
C TRACH RRNA SPEC QL NAA+PROBE: SIGNIFICANT CHANGE UP
N GONORRHOEA RRNA SPEC QL NAA+PROBE: SIGNIFICANT CHANGE UP
SPECIMEN SOURCE: SIGNIFICANT CHANGE UP

## 2020-03-31 DIAGNOSIS — O99.89 OTHER SPECIFIED DISEASES AND CONDITIONS COMPLICATING PREGNANCY, CHILDBIRTH AND THE PUERPERIUM: ICD-10-CM

## 2020-03-31 DIAGNOSIS — E03.9 HYPOTHYROIDISM, UNSPECIFIED: ICD-10-CM

## 2020-03-31 DIAGNOSIS — Z3A.35 35 WEEKS GESTATION OF PREGNANCY: ICD-10-CM

## 2020-03-31 DIAGNOSIS — R79.89 OTHER SPECIFIED ABNORMAL FINDINGS OF BLOOD CHEMISTRY: ICD-10-CM

## 2020-03-31 DIAGNOSIS — D25.2 SUBSEROSAL LEIOMYOMA OF UTERUS: ICD-10-CM

## 2020-03-31 DIAGNOSIS — Z14.1 CYSTIC FIBROSIS CARRIER: ICD-10-CM

## 2020-03-31 DIAGNOSIS — O34.13 MATERNAL CARE FOR BENIGN TUMOR OF CORPUS UTERI, THIRD TRIMESTER: ICD-10-CM

## 2020-03-31 DIAGNOSIS — Z98.890 OTHER SPECIFIED POSTPROCEDURAL STATES: ICD-10-CM

## 2020-04-01 ENCOUNTER — APPOINTMENT (OUTPATIENT)
Dept: ANTEPARTUM | Facility: CLINIC | Age: 45
End: 2020-04-01

## 2020-04-03 ENCOUNTER — OUTPATIENT (OUTPATIENT)
Dept: OUTPATIENT SERVICES | Facility: HOSPITAL | Age: 45
LOS: 1 days | Discharge: HOME | End: 2020-04-03

## 2020-04-03 ENCOUNTER — APPOINTMENT (OUTPATIENT)
Dept: OBGYN | Facility: CLINIC | Age: 45
End: 2020-04-03
Payer: COMMERCIAL

## 2020-04-03 VITALS — WEIGHT: 158 LBS | BODY MASS INDEX: 30.86 KG/M2 | DIASTOLIC BLOOD PRESSURE: 60 MMHG | SYSTOLIC BLOOD PRESSURE: 110 MMHG

## 2020-04-03 DIAGNOSIS — Z98.890 OTHER SPECIFIED POSTPROCEDURAL STATES: Chronic | ICD-10-CM

## 2020-04-03 DIAGNOSIS — O09.529 SUPERVISION OF ELDERLY MULTIGRAVIDA, UNSPECIFIED TRIMESTER: ICD-10-CM

## 2020-04-03 DIAGNOSIS — O36.5990 MATERNAL CARE FOR OTHER KNOWN OR SUSPECTED POOR FETAL GROWTH, UNSPECIFIED TRIMESTER, NOT APPLICABLE OR UNSPECIFIED: ICD-10-CM

## 2020-04-03 PROCEDURE — 0503F POSTPARTUM CARE VISIT: CPT

## 2020-04-03 NOTE — HISTORY OF PRESENT ILLNESS
[Postpartum Follow Up] : postpartum follow up [Complications:___] : complications include: [unfilled] [Last Pap Date: ___] : Last Pap Date: [unfilled] [Delivery Date: ___] : on [unfilled] [Primary C/S] : delivered by  section [Female] : Delivery History: baby girl [NICU: ___] : NICU: [unfilled] [BF with Difficulty] : nursing with difficulty [Discharge HCT: ___] : hematocrit level was [unfilled] [Abdominal Pain] : abdominal pain [Breast Pain] : breast pain [Doing Well] : is doing well [Wt. ___] : weighing [unfilled] [Discharge HGB: ___] : hemoglobin level was [unfilled] [Resumed Menses] : has not resumed her menses [Resumed [place]] : has not resumed intercourse [Intended Contraception] : the patient does not intended to use contraception postpartum [Examination Of The Breasts] : breasts are normal [de-identified] : primary  section - 35w1d, PPROM, breech

## 2020-04-10 ENCOUNTER — APPOINTMENT (OUTPATIENT)
Dept: OBGYN | Facility: CLINIC | Age: 45
End: 2020-04-10

## 2020-05-01 ENCOUNTER — APPOINTMENT (OUTPATIENT)
Dept: OBGYN | Facility: CLINIC | Age: 45
End: 2020-05-01
Payer: COMMERCIAL

## 2020-05-01 ENCOUNTER — OUTPATIENT (OUTPATIENT)
Dept: OUTPATIENT SERVICES | Facility: HOSPITAL | Age: 45
LOS: 1 days | Discharge: HOME | End: 2020-05-01

## 2020-05-01 VITALS
WEIGHT: 149 LBS | HEIGHT: 60 IN | DIASTOLIC BLOOD PRESSURE: 64 MMHG | BODY MASS INDEX: 29.25 KG/M2 | SYSTOLIC BLOOD PRESSURE: 102 MMHG

## 2020-05-01 DIAGNOSIS — Z98.890 OTHER SPECIFIED POSTPROCEDURAL STATES: Chronic | ICD-10-CM

## 2020-05-01 DIAGNOSIS — T14.8XXS OTHER INJURY OF UNSPECIFIED BODY REGION, SEQUELA: ICD-10-CM

## 2020-05-01 PROCEDURE — 99211 OFF/OP EST MAY X REQ PHY/QHP: CPT

## 2020-05-01 NOTE — HISTORY OF PRESENT ILLNESS
[Postpartum Follow Up] : postpartum follow up [Delivery Date: ___] : on [unfilled] [Primary C/S] : delivered by  section [Female] : Delivery History: baby girl [NICU: ___] : NICU: [unfilled] [None] : No associated symptoms are reported [Erythema] : erythematous [Swelling] : swollen [Signs of Infection] : is showing signs of an infection [Breastfeeding] : not currently nursing [Chills] : no chills [Fever] : no fever [Nausea] : no nausea [Vomiting] : no vomiting [FreeTextEntry8] : wound infection [FreeTextEntry9] : 35w1d MARQUIS conn [de-identified] : had erythema surrounding incision at last visit, was given keflex x1wk which she completed. Swelling has become worse and now experiencing sharp pain at right side of incision. No fever, chills, drainage.  [de-identified] : erythema and induration surrounding right side of incision both inferiorly and superiorly to midline (approx 5cm). No areas of dehiscence. No drainage. [de-identified] : wound infection  [de-identified] : Bactrim DS BID x10 days. RTC 2wks for re-evaluation. Will call if infection worsening. F/up wound cx. Warm compresses and Motrin/Tylenol PRN

## 2020-05-05 LAB — BACTERIA SPEC CULT: ABNORMAL

## 2020-05-15 ENCOUNTER — OUTPATIENT (OUTPATIENT)
Dept: OUTPATIENT SERVICES | Facility: HOSPITAL | Age: 45
LOS: 1 days | Discharge: HOME | End: 2020-05-15

## 2020-05-15 ENCOUNTER — APPOINTMENT (OUTPATIENT)
Dept: OBGYN | Facility: CLINIC | Age: 45
End: 2020-05-15
Payer: COMMERCIAL

## 2020-05-15 ENCOUNTER — RESULT CHARGE (OUTPATIENT)
Age: 45
End: 2020-05-15

## 2020-05-15 VITALS — BODY MASS INDEX: 28.12 KG/M2 | WEIGHT: 144 LBS | DIASTOLIC BLOOD PRESSURE: 60 MMHG | SYSTOLIC BLOOD PRESSURE: 100 MMHG

## 2020-05-15 DIAGNOSIS — Z98.890 OTHER SPECIFIED POSTPROCEDURAL STATES: Chronic | ICD-10-CM

## 2020-05-15 DIAGNOSIS — L08.9 PERSONAL HISTORY OF OTHER INFECTIOUS AND PARASITIC DISEASES: ICD-10-CM

## 2020-05-15 DIAGNOSIS — E03.9 HYPOTHYROIDISM, UNSPECIFIED: ICD-10-CM

## 2020-05-15 DIAGNOSIS — Z86.19 PERSONAL HISTORY OF OTHER INFECTIOUS AND PARASITIC DISEASES: ICD-10-CM

## 2020-05-15 LAB — HCG UR QL: NEGATIVE

## 2020-05-15 PROCEDURE — 99211 OFF/OP EST MAY X REQ PHY/QHP: CPT

## 2020-05-15 NOTE — HISTORY OF PRESENT ILLNESS
[Postpartum Follow Up] : postpartum follow up [Delivery Date: ___] : on [unfilled] [Primary C/S] : delivered by  section [Female] : Delivery History: baby girl [Wt. ___] : weighing [unfilled] [Resumed [place]] : has resumed intercourse [None] : no vaginal bleeding [Not Done] : Examination of breasts not done [Healed] : healed [Doing Well] : is doing well [Complications:___] : no complications [Resumed Menses] : has not resumed her menses [Breastfeeding] : not currently nursing [Intended Contraception] : the patient does not intended to use contraception postpartum [Abdominal Pain] : no abdominal pain [Breast Pain] : no breast pain [S/Sx PP Depression] : no signs/symptoms of postpartum depression [Heavy Bleeding] : no heavy bleeding [Incisional Drainage] : no incisional drainage [Incisional Pain] : no incisional pain [Irregular Bleeding] : no irregular bleeding [Leg Pain] : no leg pain [Vaginal Discharge] : no vaginal discharge [Shortness of Breath] : no shortness of breath [Chills] : no chills [Fatigue] : no fatigue [Headache] : no headache [Fever] : no fever [Nausea] : no nausea [Vomiting] : no vomiting [Erythema] : not erythematous [Swelling] : not swollen [Dehiscence] : not dehisced [de-identified] : currently being treated for wound infection, taking Bactrim x10d as prescribed. Reports improvement in symtpoms, only noticing mild induration. Denies incisional pain, drainage or erythema [de-identified] : area on induration on superior aspect of right side of incision. No drainage or areas of dehiscence. [de-identified] : Currently on day 5/10 of Bactrim, reports improvement of symptoms [de-identified] : Continue antibiotics as prescribed, recommend warm compresses, instructed to call if infection worsens. Sent Synthroid 75mcg (prepreg dose) to pharmacy. Sent for TSH and T4 in 4 weeks.

## 2020-05-27 ENCOUNTER — LABORATORY RESULT (OUTPATIENT)
Age: 45
End: 2020-05-27

## 2020-05-27 DIAGNOSIS — E03.9 HYPOTHYROIDISM, UNSPECIFIED: ICD-10-CM

## 2020-06-21 NOTE — OB RN PATIENT PROFILE - TEMPERATURE IN CELSIUS (DEGREES C)
Lab Results   Component Value Date    HGBA1C 6 4 (H) 05/12/2020       Recent Labs     06/20/20  1114 06/20/20  1626 06/20/20 2035 06/21/20  0711   POCGLU 181* 112 133 147*       Blood Sugar Average: Last 72 hrs: 36.7

## 2020-08-12 RX ORDER — LEVOTHYROXINE SODIUM 0.09 MG/1
88 TABLET ORAL DAILY
Qty: 31 | Refills: 1 | Status: DISCONTINUED | COMMUNITY
Start: 2020-03-27 | End: 2020-08-12

## 2020-08-12 RX ORDER — CEPHALEXIN 500 MG/1
500 CAPSULE ORAL
Qty: 14 | Refills: 0 | Status: DISCONTINUED | COMMUNITY
Start: 2020-04-03 | End: 2020-08-12

## 2020-08-12 RX ORDER — SULFAMETHOXAZOLE AND TRIMETHOPRIM 800; 160 MG/1; MG/1
800-160 TABLET ORAL TWICE DAILY
Qty: 20 | Refills: 0 | Status: DISCONTINUED | COMMUNITY
Start: 2020-05-01 | End: 2020-08-12

## 2020-11-18 NOTE — ED ADULT NURSE NOTE - NSIMPLEMENTINTERV_GEN_ALL_ED
no concerns Implemented All Universal Safety Interventions:  [place] to call system. Call bell, personal items and telephone within reach. Instruct patient to call for assistance. Room bathroom lighting operational. Non-slip footwear when patient is off stretcher. Physically safe environment: no spills, clutter or unnecessary equipment. Stretcher in lowest position, wheels locked, appropriate side rails in place.

## 2020-12-18 RX ORDER — LEVOTHYROXINE SODIUM 0.07 MG/1
75 TABLET ORAL DAILY
Qty: 30 | Refills: 3 | Status: ACTIVE | COMMUNITY
Start: 2020-05-15 | End: 1900-01-01

## 2020-12-21 ENCOUNTER — NON-APPOINTMENT (OUTPATIENT)
Age: 45
End: 2020-12-21

## 2021-05-21 ENCOUNTER — APPOINTMENT (OUTPATIENT)
Dept: OBGYN | Facility: CLINIC | Age: 46
End: 2021-05-21

## 2023-04-17 NOTE — ED PROVIDER NOTE - NSFOLLOWUPINSTRUCTIONS_ED_ALL_ED_FT
PT CALLED WAS SEEN ON Saturday AND WOULD LIKE A REFERRAL FOR PAIN MGMT REGARDING THE BACK PAIN SHE DISCUSSED. PT HAS PREVIOUSLY GONE TO [facility]  AT [address] [place] [address] [phone] [phone]. PLS FAX REFERRAL AND THEN CALL AND NOTIFY PATIENT REFERRAL WAS FAXED.    Abdominal Pain During Pregnancy    Abdominal pain is common in pregnancy. Most of the time, it does not cause harm. There are many causes of abdominal pain. Some causes are more serious than others. Some of the causes of abdominal pain in pregnancy are easily diagnosed. Occasionally, the diagnosis takes time to understand. Other times, the cause is not determined. Abdominal pain can be a sign that something is very wrong with the pregnancy, or the pain may have nothing to do with the pregnancy at all. For this reason, always tell your health care provider if you have any abdominal discomfort.     HOME CARE INSTRUCTIONS  Monitor your abdominal pain for any changes. The following actions may help to alleviate any discomfort you are experiencing:    Do not have sexual intercourse or put anything in your vagina until your symptoms go away completely.  Get plenty of rest until your pain improves.   Drink clear fluids if you feel nauseous. Avoid solid food as long as you are uncomfortable or nauseous.  Only take over-the-counter or prescription medicine as directed by your health care provider.  Keep all follow-up appointments with your health care provider.    SEEK IMMEDIATE MEDICAL CARE IF:  You are bleeding, leaking fluid, or passing tissue from the vagina.  You have increasing pain or cramping.  You have persistent vomiting.  You have painful or bloody urination.  You have a fever.  You notice a decrease in your baby's movements.  You have extreme weakness or feel faint.  You have shortness of breath, with or without abdominal pain.  You develop a severe headache with abdominal pain.  You have abnormal vaginal discharge with abdominal pain.  You have persistent diarrhea.  You have abdominal pain that continues even after rest, or gets worse.    MAKE SURE YOU:  Understand these instructions.   Will watch your condition.  Will get help right away if you are not doing well or get worse.    ADDITIONAL NOTES AND INSTRUCTIONS    Please follow up with your Primary MD in 24-48 hr.  Seek immediate medical care for any new/worsening signs or symptoms.     Follow up with your primary medical doctor in 1-2 days